# Patient Record
Sex: MALE | Race: WHITE | NOT HISPANIC OR LATINO
[De-identification: names, ages, dates, MRNs, and addresses within clinical notes are randomized per-mention and may not be internally consistent; named-entity substitution may affect disease eponyms.]

---

## 2018-02-08 ENCOUNTER — APPOINTMENT (OUTPATIENT)
Dept: ORTHOPEDIC SURGERY | Facility: CLINIC | Age: 68
End: 2018-02-08

## 2018-03-15 ENCOUNTER — APPOINTMENT (OUTPATIENT)
Dept: ORTHOPEDIC SURGERY | Facility: CLINIC | Age: 68
End: 2018-03-15
Payer: MEDICARE

## 2018-03-15 VITALS
HEIGHT: 75 IN | BODY MASS INDEX: 25.49 KG/M2 | WEIGHT: 205 LBS | SYSTOLIC BLOOD PRESSURE: 155 MMHG | HEART RATE: 62 BPM | DIASTOLIC BLOOD PRESSURE: 92 MMHG

## 2018-03-15 DIAGNOSIS — Z78.9 OTHER SPECIFIED HEALTH STATUS: ICD-10-CM

## 2018-03-15 DIAGNOSIS — Z86.39 PERSONAL HISTORY OF OTHER ENDOCRINE, NUTRITIONAL AND METABOLIC DISEASE: ICD-10-CM

## 2018-03-15 DIAGNOSIS — D44.9: ICD-10-CM

## 2018-03-15 DIAGNOSIS — Z85.07 PERSONAL HISTORY OF MALIGNANT NEOPLASM OF PANCREAS: ICD-10-CM

## 2018-03-15 DIAGNOSIS — M54.6 PAIN IN THORACIC SPINE: ICD-10-CM

## 2018-03-15 DIAGNOSIS — M47.817 SPONDYLOSIS W/OUT MYELOPATHY OR RADICULOPATHY, LUMBOSACRAL REGION: ICD-10-CM

## 2018-03-15 DIAGNOSIS — Z80.0 FAMILY HISTORY OF MALIGNANT NEOPLASM OF DIGESTIVE ORGANS: ICD-10-CM

## 2018-03-15 DIAGNOSIS — R07.81 PLEURODYNIA: ICD-10-CM

## 2018-03-15 PROCEDURE — 72070 X-RAY EXAM THORAC SPINE 2VWS: CPT

## 2018-03-15 PROCEDURE — 72100 X-RAY EXAM L-S SPINE 2/3 VWS: CPT

## 2018-03-15 PROCEDURE — 99204 OFFICE O/P NEW MOD 45 MIN: CPT

## 2018-07-28 PROBLEM — M47.817 LUMBOSACRAL SPONDYLOSIS: Status: ACTIVE | Noted: 2018-03-15

## 2019-05-07 ENCOUNTER — APPOINTMENT (OUTPATIENT)
Dept: COLORECTAL SURGERY | Facility: CLINIC | Age: 69
End: 2019-05-07
Payer: MEDICARE

## 2019-05-07 VITALS
BODY MASS INDEX: 26.11 KG/M2 | RESPIRATION RATE: 14 BRPM | DIASTOLIC BLOOD PRESSURE: 98 MMHG | OXYGEN SATURATION: 95 % | HEART RATE: 63 BPM | WEIGHT: 210 LBS | SYSTOLIC BLOOD PRESSURE: 142 MMHG | HEIGHT: 75 IN

## 2019-05-07 DIAGNOSIS — Z12.11 ENCOUNTER FOR SCREENING FOR MALIGNANT NEOPLASM OF COLON: ICD-10-CM

## 2019-05-07 DIAGNOSIS — Z87.19 PERSONAL HISTORY OF OTHER DISEASES OF THE DIGESTIVE SYSTEM: ICD-10-CM

## 2019-05-07 DIAGNOSIS — K60.3 ANAL FISTULA: ICD-10-CM

## 2019-05-07 DIAGNOSIS — Z83.71 FAMILY HISTORY OF COLONIC POLYPS: ICD-10-CM

## 2019-05-07 DIAGNOSIS — Z78.9 OTHER SPECIFIED HEALTH STATUS: ICD-10-CM

## 2019-05-07 DIAGNOSIS — Z86.79 PERSONAL HISTORY OF OTHER DISEASES OF THE CIRCULATORY SYSTEM: ICD-10-CM

## 2019-05-07 DIAGNOSIS — D3A.8 OTHER BENIGN NEUROENDOCRINE TUMORS: ICD-10-CM

## 2019-05-07 PROCEDURE — 99203 OFFICE O/P NEW LOW 30 MIN: CPT

## 2019-05-07 RX ORDER — UBIDECARENONE/VIT E ACET 100MG-5
CAPSULE ORAL
Refills: 0 | Status: DISCONTINUED | COMMUNITY
End: 2019-05-07

## 2019-05-07 NOTE — ASSESSMENT
[FreeTextEntry1] : Screening colonoscopy\par -Patient progressing well\par -MiraLax prep given\par -All questions answered\par -Patient scheduled for colonoscopy

## 2019-05-07 NOTE — PHYSICAL EXAM
[Normal Breath Sounds] : Normal breath sounds [Normal Heart Sounds] : normal heart sounds [Normal Rate and Rhythm] : normal rate and rhythm [Alert] : alert [Oriented to Person] : oriented to person [Oriented to Place] : oriented to place [Oriented to Time] : oriented to time [Calm] : calm [de-identified] : soft, NT/ND +BS [de-identified] : +ROM/PAULINA [de-identified] : well nourished male [de-identified] : NC/AT [de-identified] : intact

## 2019-05-07 NOTE — HISTORY OF PRESENT ILLNESS
[FreeTextEntry1] : 67yo male PMH neuroendocrine tumor of pancreas (underwent laparoscopy for removal) presents for office visit prior to colonoscopy.  Pt with +Fam Hx (Mother Dx age 55,  age 57, brother and sister history of colon polyps).  Pt most recent Colonoscopy  (Dr. Dennis).  Pt reports nl study.  Pt has a daily BM, denies constipation/straining, pain or BRBPR.  No aggravating factors.

## 2019-05-16 ENCOUNTER — APPOINTMENT (OUTPATIENT)
Dept: COLORECTAL SURGERY | Facility: CLINIC | Age: 69
End: 2019-05-16
Payer: MEDICARE

## 2019-05-16 PROCEDURE — 45378 DIAGNOSTIC COLONOSCOPY: CPT

## 2020-12-21 PROBLEM — Z12.11 ENCOUNTER FOR SCREENING COLONOSCOPY: Status: RESOLVED | Noted: 2019-05-07 | Resolved: 2020-12-21

## 2021-07-01 ENCOUNTER — INPATIENT (INPATIENT)
Facility: HOSPITAL | Age: 71
LOS: 0 days | Discharge: ANOTHER HOSPITAL | End: 2021-07-02
Attending: INTERNAL MEDICINE | Admitting: INTERNAL MEDICINE
Payer: MEDICARE

## 2021-07-01 ENCOUNTER — TRANSCRIPTION ENCOUNTER (OUTPATIENT)
Age: 71
End: 2021-07-01

## 2021-07-01 VITALS
TEMPERATURE: 98 F | HEART RATE: 60 BPM | OXYGEN SATURATION: 100 % | DIASTOLIC BLOOD PRESSURE: 101 MMHG | HEIGHT: 75 IN | RESPIRATION RATE: 18 BRPM | WEIGHT: 192.9 LBS | SYSTOLIC BLOOD PRESSURE: 148 MMHG

## 2021-07-01 DIAGNOSIS — R94.39 ABNORMAL RESULT OF OTHER CARDIOVASCULAR FUNCTION STUDY: ICD-10-CM

## 2021-07-01 DIAGNOSIS — Z98.890 OTHER SPECIFIED POSTPROCEDURAL STATES: Chronic | ICD-10-CM

## 2021-07-01 DIAGNOSIS — C7A.8 OTHER MALIGNANT NEUROENDOCRINE TUMORS: Chronic | ICD-10-CM

## 2021-07-01 LAB
A1C WITH ESTIMATED AVERAGE GLUCOSE RESULT: 5.2 % — SIGNIFICANT CHANGE UP (ref 4–5.6)
ANION GAP SERPL CALC-SCNC: 14 MMOL/L — SIGNIFICANT CHANGE UP (ref 7–14)
B PERT DNA SPEC QL NAA+PROBE: SIGNIFICANT CHANGE UP
BUN SERPL-MCNC: 17 MG/DL — SIGNIFICANT CHANGE UP (ref 7–23)
C PNEUM DNA SPEC QL NAA+PROBE: SIGNIFICANT CHANGE UP
CALCIUM SERPL-MCNC: 9.9 MG/DL — SIGNIFICANT CHANGE UP (ref 8.4–10.5)
CHLORIDE SERPL-SCNC: 104 MMOL/L — SIGNIFICANT CHANGE UP (ref 98–107)
CO2 SERPL-SCNC: 23 MMOL/L — SIGNIFICANT CHANGE UP (ref 22–31)
CREAT SERPL-MCNC: 1.26 MG/DL — SIGNIFICANT CHANGE UP (ref 0.5–1.3)
ESTIMATED AVERAGE GLUCOSE: 103 — SIGNIFICANT CHANGE UP
FLUAV SUBTYP SPEC NAA+PROBE: SIGNIFICANT CHANGE UP
FLUBV RNA SPEC QL NAA+PROBE: SIGNIFICANT CHANGE UP
GLUCOSE SERPL-MCNC: 102 MG/DL — HIGH (ref 70–99)
HADV DNA SPEC QL NAA+PROBE: SIGNIFICANT CHANGE UP
HCOV 229E RNA SPEC QL NAA+PROBE: SIGNIFICANT CHANGE UP
HCOV HKU1 RNA SPEC QL NAA+PROBE: SIGNIFICANT CHANGE UP
HCOV NL63 RNA SPEC QL NAA+PROBE: SIGNIFICANT CHANGE UP
HCOV OC43 RNA SPEC QL NAA+PROBE: SIGNIFICANT CHANGE UP
HCT VFR BLD CALC: 49 % — SIGNIFICANT CHANGE UP (ref 39–50)
HGB BLD-MCNC: 16.2 G/DL — SIGNIFICANT CHANGE UP (ref 13–17)
HMPV RNA SPEC QL NAA+PROBE: SIGNIFICANT CHANGE UP
HPIV1 RNA SPEC QL NAA+PROBE: SIGNIFICANT CHANGE UP
HPIV2 RNA SPEC QL NAA+PROBE: SIGNIFICANT CHANGE UP
HPIV3 RNA SPEC QL NAA+PROBE: SIGNIFICANT CHANGE UP
HPIV4 RNA SPEC QL NAA+PROBE: SIGNIFICANT CHANGE UP
MCHC RBC-ENTMCNC: 29.3 PG — SIGNIFICANT CHANGE UP (ref 27–34)
MCHC RBC-ENTMCNC: 33.1 GM/DL — SIGNIFICANT CHANGE UP (ref 32–36)
MCV RBC AUTO: 88.8 FL — SIGNIFICANT CHANGE UP (ref 80–100)
NRBC # BLD: 0 /100 WBCS — SIGNIFICANT CHANGE UP
NRBC # FLD: 0 K/UL — SIGNIFICANT CHANGE UP
PLATELET # BLD AUTO: 170 K/UL — SIGNIFICANT CHANGE UP (ref 150–400)
POTASSIUM SERPL-MCNC: 3.8 MMOL/L — SIGNIFICANT CHANGE UP (ref 3.5–5.3)
POTASSIUM SERPL-SCNC: 3.8 MMOL/L — SIGNIFICANT CHANGE UP (ref 3.5–5.3)
RAPID RVP RESULT: SIGNIFICANT CHANGE UP
RBC # BLD: 5.52 M/UL — SIGNIFICANT CHANGE UP (ref 4.2–5.8)
RBC # FLD: 12.6 % — SIGNIFICANT CHANGE UP (ref 10.3–14.5)
RSV RNA SPEC QL NAA+PROBE: SIGNIFICANT CHANGE UP
RV+EV RNA SPEC QL NAA+PROBE: SIGNIFICANT CHANGE UP
SARS-COV-2 RNA SPEC QL NAA+PROBE: SIGNIFICANT CHANGE UP
SODIUM SERPL-SCNC: 141 MMOL/L — SIGNIFICANT CHANGE UP (ref 135–145)
WBC # BLD: 6.49 K/UL — SIGNIFICANT CHANGE UP (ref 3.8–10.5)
WBC # FLD AUTO: 6.49 K/UL — SIGNIFICANT CHANGE UP (ref 3.8–10.5)

## 2021-07-01 PROCEDURE — 93010 ELECTROCARDIOGRAM REPORT: CPT

## 2021-07-01 PROCEDURE — 93454 CORONARY ARTERY ANGIO S&I: CPT | Mod: 26

## 2021-07-01 PROCEDURE — 99152 MOD SED SAME PHYS/QHP 5/>YRS: CPT

## 2021-07-01 RX ORDER — UBIDECARENONE 100 MG
0 CAPSULE ORAL
Qty: 0 | Refills: 0 | DISCHARGE

## 2021-07-01 RX ORDER — ATORVASTATIN CALCIUM 80 MG/1
1 TABLET, FILM COATED ORAL
Qty: 0 | Refills: 0 | DISCHARGE

## 2021-07-01 RX ORDER — ATORVASTATIN CALCIUM 80 MG/1
40 TABLET, FILM COATED ORAL AT BEDTIME
Refills: 0 | Status: DISCONTINUED | OUTPATIENT
Start: 2021-07-01 | End: 2021-07-02

## 2021-07-01 RX ORDER — ASPIRIN/CALCIUM CARB/MAGNESIUM 324 MG
1 TABLET ORAL
Qty: 0 | Refills: 0 | DISCHARGE
Start: 2021-07-01

## 2021-07-01 RX ORDER — OMEGA-3 ACID ETHYL ESTERS 1 G
0 CAPSULE ORAL
Qty: 0 | Refills: 0 | DISCHARGE

## 2021-07-01 RX ORDER — ACETAMINOPHEN 500 MG
650 TABLET ORAL ONCE
Refills: 0 | Status: COMPLETED | OUTPATIENT
Start: 2021-07-01 | End: 2021-07-01

## 2021-07-01 RX ORDER — ATORVASTATIN CALCIUM 80 MG/1
1 TABLET, FILM COATED ORAL
Qty: 0 | Refills: 0 | DISCHARGE
Start: 2021-07-01

## 2021-07-01 RX ORDER — LOSARTAN POTASSIUM 100 MG/1
50 TABLET, FILM COATED ORAL DAILY
Refills: 0 | Status: DISCONTINUED | OUTPATIENT
Start: 2021-07-01 | End: 2021-07-02

## 2021-07-01 RX ORDER — SODIUM CHLORIDE 9 MG/ML
3 INJECTION INTRAMUSCULAR; INTRAVENOUS; SUBCUTANEOUS EVERY 8 HOURS
Refills: 0 | Status: DISCONTINUED | OUTPATIENT
Start: 2021-07-01 | End: 2021-07-02

## 2021-07-01 RX ORDER — LOSARTAN POTASSIUM 100 MG/1
1 TABLET, FILM COATED ORAL
Qty: 0 | Refills: 0 | DISCHARGE
Start: 2021-07-01

## 2021-07-01 RX ORDER — ASPIRIN/CALCIUM CARB/MAGNESIUM 324 MG
81 TABLET ORAL DAILY
Refills: 0 | Status: DISCONTINUED | OUTPATIENT
Start: 2021-07-01 | End: 2021-07-02

## 2021-07-01 RX ORDER — ERGOCALCIFEROL 1.25 MG/1
0 CAPSULE ORAL
Qty: 0 | Refills: 0 | DISCHARGE

## 2021-07-01 RX ORDER — LOSARTAN POTASSIUM 100 MG/1
1 TABLET, FILM COATED ORAL
Qty: 0 | Refills: 0 | DISCHARGE

## 2021-07-01 RX ORDER — SODIUM CHLORIDE 9 MG/ML
3 INJECTION INTRAMUSCULAR; INTRAVENOUS; SUBCUTANEOUS
Qty: 0 | Refills: 0 | DISCHARGE
Start: 2021-07-01

## 2021-07-01 RX ORDER — LANOLIN ALCOHOL/MO/W.PET/CERES
9 CREAM (GRAM) TOPICAL ONCE
Refills: 0 | Status: COMPLETED | OUTPATIENT
Start: 2021-07-01 | End: 2021-07-01

## 2021-07-01 RX ADMIN — SODIUM CHLORIDE 3 MILLILITER(S): 9 INJECTION INTRAMUSCULAR; INTRAVENOUS; SUBCUTANEOUS at 22:57

## 2021-07-01 RX ADMIN — Medication 9 MILLIGRAM(S): at 23:26

## 2021-07-01 RX ADMIN — LOSARTAN POTASSIUM 50 MILLIGRAM(S): 100 TABLET, FILM COATED ORAL at 22:40

## 2021-07-01 RX ADMIN — Medication 650 MILLIGRAM(S): at 21:29

## 2021-07-01 RX ADMIN — Medication 650 MILLIGRAM(S): at 20:29

## 2021-07-01 RX ADMIN — ATORVASTATIN CALCIUM 40 MILLIGRAM(S): 80 TABLET, FILM COATED ORAL at 22:40

## 2021-07-01 RX ADMIN — Medication 81 MILLIGRAM(S): at 22:40

## 2021-07-01 NOTE — DISCHARGE NOTE PROVIDER - HOSPITAL COURSE
70 y.o. male presents today for elective cardiac catheterization. The patient c/o midsternal chest pressure, started 6 weeks ago, aggravate while playing tennis or jogging, last 1/2 hr, sometimes radiates to L shoulder and L axiliary area,  resolve on its own or with rest. The patient denies SOB, palpitations, dizziness, presyncope, syncope,  headache, visual disturbances, CVA, PE, DVT, JENA, abdominal pain, N/V/D/C, hematochezia, melena, dysuria, hematuria, fever, chills. The patient was evaluated by his cardiologist, was found to have abnormal stress test. Due to patient's symptoms and abnormal non invasive tests, the patient was recommended to have cardiac cath.   On 7/1 underwent elective cardiac cath with the following results - LM 20%, oLAD 90%, mLAD 90%, pLCx 70%=> needs CABG. Dr Bush discussed with patient, patient agreed to be transferred to [facility] for further CT surgery evaluation, and  accepting physician CT surgeon Dr Westfall .  Patient medically stable. Afebrile, VSS. Patient seen and evaluated. Consent for transfer signed, and in the chart .All questions and concerns addressed and answered . Patient understands and agrees with plan of care.

## 2021-07-01 NOTE — H&P CARDIOLOGY - HISTORY OF PRESENT ILLNESS
70 y.o. male presents today for elective cardiac catheterization. The patient c/o midsternal chest pressure, started 6 weeks ago, aggravate while playing tennis or jogging, last 1/2 hr, sometimes radiates to L shoulder and L axiliary area,  resolve on its own or with rest. The patient denies SOB, palpitations, dizziness, presyncope, syncope,  headache, visual disturbances, CVA, PE, DVT, JENA, abdominal pain, N/V/D/C, hematochezia, melena, dysuria, hematuria, fever, chills. The patient was evaluated by his cardiologist, was found to have abnormal stress test. Due to patient's symptoms and abnormal non invasive tests, the patient was recommended to have cardiac cath. The patient denies any complaints at present.     Stress Echo 6/28/21 - 64%. Lateral wall is moderately hypokinetic, stress ECG is positive for ischemia.

## 2021-07-01 NOTE — DISCHARGE NOTE PROVIDER - NSDCMRMEDTOKEN_GEN_ALL_CORE_FT
aspirin 81 mg oral delayed release tablet: 1 tab(s) orally once a day  atorvastatin 40 mg oral tablet: 1 tab(s) orally once a day (at bedtime)  losartan 50 mg oral tablet: 1 tab(s) orally once a day  sodium chloride 0.9% injectable solution: 3 milliliter(s) injectable every 8 hours

## 2021-07-01 NOTE — DISCHARGE NOTE PROVIDER - NSDCCPCAREPLAN_GEN_ALL_CORE_FT
PRINCIPAL DISCHARGE DIAGNOSIS  Diagnosis: CAD, multiple vessel  Assessment and Plan of Treatment: You underwent cardiac cath and due to the location of your blockages you will be transferred over to [facility] for Cardiothoracic evaluation with Dr Westfall for possible Bypass surgery.      SECONDARY DISCHARGE DIAGNOSES  Diagnosis: HTN (hypertension)  Assessment and Plan of Treatment: Continue blood pressure medication regimen as directed. Monitor for any visual changes, headaches or dizziness.  Monitor blood pressure regularly.  Follow up with your PCP for further management for high blood pressure.

## 2021-07-01 NOTE — DISCHARGE NOTE PROVIDER - NSDCQMERRANDS_GEN_ALL_CORE
Yes I, Mukesh Matias DO,  performed the initial face to face bedside interview with this patient regarding history of present illness, review of symptoms and relevant past medical, social and family history.  I completed an independent physical examination.  I was the initial provider who evaluated this patient. I have signed out the follow up of any pending tests (i.e. labs, radiological studies) to the ACP.  I have communicated the patient’s plan of care and disposition with the ACP.

## 2021-07-01 NOTE — H&P CARDIOLOGY - PMH
HTN (hypertension), benign    Hypercholesterolemia    Neuroendocrine carcinoma of pancreas  treated surgically 12 years ago

## 2021-07-01 NOTE — H&P CARDIOLOGY - REVIEW OF SYSTEMS
The patient denies SOB, palpitations, dizziness, presyncope, syncope,  headache, visual disturbances, CVA, PE, DVT, JENA, abdominal pain, N/V/D/C, hematochezia, melena, dysuria, hematuria, fever, chills.

## 2021-07-01 NOTE — H&P CARDIOLOGY - FAMILY HISTORY
Father  Still living? No  Family history of heart attack, Age at diagnosis: Age Unknown     Mother  Still living? No  Family history of colon cancer in mother, Age at diagnosis: Age Unknown

## 2021-07-02 ENCOUNTER — INPATIENT (INPATIENT)
Facility: HOSPITAL | Age: 71
LOS: 4 days | Discharge: HOME HEALTH | DRG: 235 | End: 2021-07-07
Attending: THORACIC SURGERY (CARDIOTHORACIC VASCULAR SURGERY) | Admitting: THORACIC SURGERY (CARDIOTHORACIC VASCULAR SURGERY)
Payer: MEDICARE

## 2021-07-02 ENCOUNTER — TRANSCRIPTION ENCOUNTER (OUTPATIENT)
Age: 71
End: 2021-07-02

## 2021-07-02 VITALS
TEMPERATURE: 98 F | HEIGHT: 75 IN | HEART RATE: 66 BPM | RESPIRATION RATE: 18 BRPM | DIASTOLIC BLOOD PRESSURE: 92 MMHG | OXYGEN SATURATION: 95 % | WEIGHT: 192.68 LBS | SYSTOLIC BLOOD PRESSURE: 138 MMHG

## 2021-07-02 VITALS
SYSTOLIC BLOOD PRESSURE: 123 MMHG | HEART RATE: 66 BPM | TEMPERATURE: 98 F | RESPIRATION RATE: 17 BRPM | DIASTOLIC BLOOD PRESSURE: 83 MMHG | OXYGEN SATURATION: 98 %

## 2021-07-02 DIAGNOSIS — C7A.8 OTHER MALIGNANT NEUROENDOCRINE TUMORS: Chronic | ICD-10-CM

## 2021-07-02 DIAGNOSIS — E78.00 PURE HYPERCHOLESTEROLEMIA, UNSPECIFIED: ICD-10-CM

## 2021-07-02 DIAGNOSIS — I25.10 ATHEROSCLEROTIC HEART DISEASE OF NATIVE CORONARY ARTERY WITHOUT ANGINA PECTORIS: ICD-10-CM

## 2021-07-02 DIAGNOSIS — I10 ESSENTIAL (PRIMARY) HYPERTENSION: ICD-10-CM

## 2021-07-02 DIAGNOSIS — Z98.890 OTHER SPECIFIED POSTPROCEDURAL STATES: Chronic | ICD-10-CM

## 2021-07-02 LAB
ANION GAP SERPL CALC-SCNC: 16 MMOL/L — HIGH (ref 7–14)
BUN SERPL-MCNC: 18 MG/DL — SIGNIFICANT CHANGE UP (ref 7–23)
CALCIUM SERPL-MCNC: 9.6 MG/DL — SIGNIFICANT CHANGE UP (ref 8.4–10.5)
CHLORIDE SERPL-SCNC: 105 MMOL/L — SIGNIFICANT CHANGE UP (ref 98–107)
CO2 SERPL-SCNC: 17 MMOL/L — LOW (ref 22–31)
COVID-19 SPIKE DOMAIN AB INTERP: POSITIVE
COVID-19 SPIKE DOMAIN ANTIBODY RESULT: >250 U/ML — HIGH
CREAT SERPL-MCNC: 1.1 MG/DL — SIGNIFICANT CHANGE UP (ref 0.5–1.3)
GLUCOSE SERPL-MCNC: 66 MG/DL — LOW (ref 70–99)
HCT VFR BLD CALC: 48.4 % — SIGNIFICANT CHANGE UP (ref 39–50)
HGB BLD-MCNC: 15.7 G/DL — SIGNIFICANT CHANGE UP (ref 13–17)
MAGNESIUM SERPL-MCNC: 2.2 MG/DL — SIGNIFICANT CHANGE UP (ref 1.6–2.6)
MCHC RBC-ENTMCNC: 29.5 PG — SIGNIFICANT CHANGE UP (ref 27–34)
MCHC RBC-ENTMCNC: 32.4 GM/DL — SIGNIFICANT CHANGE UP (ref 32–36)
MCV RBC AUTO: 90.8 FL — SIGNIFICANT CHANGE UP (ref 80–100)
NRBC # BLD: 0 /100 WBCS — SIGNIFICANT CHANGE UP
NRBC # FLD: 0 K/UL — SIGNIFICANT CHANGE UP
PHOSPHATE SERPL-MCNC: 3.2 MG/DL — SIGNIFICANT CHANGE UP (ref 2.5–4.5)
PLATELET # BLD AUTO: 145 K/UL — LOW (ref 150–400)
POTASSIUM SERPL-MCNC: 4.1 MMOL/L — SIGNIFICANT CHANGE UP (ref 3.5–5.3)
POTASSIUM SERPL-SCNC: 4.1 MMOL/L — SIGNIFICANT CHANGE UP (ref 3.5–5.3)
RBC # BLD: 5.33 M/UL — SIGNIFICANT CHANGE UP (ref 4.2–5.8)
RBC # FLD: 12.9 % — SIGNIFICANT CHANGE UP (ref 10.3–14.5)
SARS-COV-2 IGG+IGM SERPL QL IA: >250 U/ML — HIGH
SARS-COV-2 IGG+IGM SERPL QL IA: POSITIVE
SODIUM SERPL-SCNC: 138 MMOL/L — SIGNIFICANT CHANGE UP (ref 135–145)
WBC # BLD: 6.14 K/UL — SIGNIFICANT CHANGE UP (ref 3.8–10.5)
WBC # FLD AUTO: 6.14 K/UL — SIGNIFICANT CHANGE UP (ref 3.8–10.5)

## 2021-07-02 PROCEDURE — 99223 1ST HOSP IP/OBS HIGH 75: CPT

## 2021-07-02 PROCEDURE — 71045 X-RAY EXAM CHEST 1 VIEW: CPT | Mod: 26

## 2021-07-02 RX ORDER — PANTOPRAZOLE SODIUM 20 MG/1
40 TABLET, DELAYED RELEASE ORAL
Refills: 0 | Status: DISCONTINUED | OUTPATIENT
Start: 2021-07-02 | End: 2021-07-04

## 2021-07-02 RX ORDER — SODIUM CHLORIDE 9 MG/ML
3 INJECTION INTRAMUSCULAR; INTRAVENOUS; SUBCUTANEOUS EVERY 8 HOURS
Refills: 0 | Status: DISCONTINUED | OUTPATIENT
Start: 2021-07-02 | End: 2021-07-04

## 2021-07-02 RX ORDER — ENOXAPARIN SODIUM 100 MG/ML
40 INJECTION SUBCUTANEOUS ONCE
Refills: 0 | Status: COMPLETED | OUTPATIENT
Start: 2021-07-03 | End: 2021-07-03

## 2021-07-02 RX ORDER — ENOXAPARIN SODIUM 100 MG/ML
30 INJECTION SUBCUTANEOUS DAILY
Refills: 0 | Status: DISCONTINUED | OUTPATIENT
Start: 2021-07-02 | End: 2021-07-02

## 2021-07-02 RX ORDER — ATORVASTATIN CALCIUM 80 MG/1
40 TABLET, FILM COATED ORAL AT BEDTIME
Refills: 0 | Status: DISCONTINUED | OUTPATIENT
Start: 2021-07-02 | End: 2021-07-04

## 2021-07-02 RX ORDER — TAMSULOSIN HYDROCHLORIDE 0.4 MG/1
0.4 CAPSULE ORAL AT BEDTIME
Refills: 0 | Status: DISCONTINUED | OUTPATIENT
Start: 2021-07-02 | End: 2021-07-04

## 2021-07-02 RX ORDER — METOPROLOL TARTRATE 50 MG
12.5 TABLET ORAL
Refills: 0 | Status: DISCONTINUED | OUTPATIENT
Start: 2021-07-02 | End: 2021-07-04

## 2021-07-02 RX ORDER — ASPIRIN/CALCIUM CARB/MAGNESIUM 324 MG
81 TABLET ORAL DAILY
Refills: 0 | Status: DISCONTINUED | OUTPATIENT
Start: 2021-07-03 | End: 2021-07-04

## 2021-07-02 RX ORDER — ALPRAZOLAM 0.25 MG
0.25 TABLET ORAL ONCE
Refills: 0 | Status: DISCONTINUED | OUTPATIENT
Start: 2021-07-02 | End: 2021-07-02

## 2021-07-02 RX ADMIN — Medication 81 MILLIGRAM(S): at 12:08

## 2021-07-02 RX ADMIN — SODIUM CHLORIDE 3 MILLILITER(S): 9 INJECTION INTRAMUSCULAR; INTRAVENOUS; SUBCUTANEOUS at 06:28

## 2021-07-02 RX ADMIN — Medication 0.25 MILLIGRAM(S): at 18:55

## 2021-07-02 RX ADMIN — SODIUM CHLORIDE 3 MILLILITER(S): 9 INJECTION INTRAMUSCULAR; INTRAVENOUS; SUBCUTANEOUS at 12:03

## 2021-07-02 RX ADMIN — ATORVASTATIN CALCIUM 40 MILLIGRAM(S): 80 TABLET, FILM COATED ORAL at 22:49

## 2021-07-02 RX ADMIN — Medication 0.5 MILLIGRAM(S): at 10:08

## 2021-07-02 NOTE — H&P ADULT - NSHPREVIEWOFSYSTEMS_GEN_ALL_CORE
REVIEW OF SYSTEMS:  CONSTITUTIONAL: Denies fever, weight loss, or fatigue  EYES: Denies eye pain, visual disturbances, or discharge  RESPIRATORY: Denies SOB, cough, wheezing, chills or hemoptysis  CARDIOVASCULAR: chest pressure x6 weeks on exertion  GASTROINTESTINAL: Denies abdominal pain, nausea, vomiting, hematemesis, diarrhea, melena  GENITOURINARY: Denies dysuria, frequency, hematuria, or incontinence  NEUROLOGICAL: Denies headaches, memory loss, loss of strength, numbness, or tremors  SKIN: Denies itching, burning, rashes, or lesions   ENDOCRINE: Denies heat or cold intolerance, hair loss  MUSCULOSKELETAL: Denies joint pain or swelling, muscle, back, or extremity pain  PSYCHIATRIC: Denies depression, anxiety, mood swings, or difficulty sleeping  HEME/LYMPH: Denies easy bruising, denies bleeding gums and mucous membranes

## 2021-07-02 NOTE — H&P ADULT - ASSESSMENT
70 y.o. male pmHX HTN, HLD, remote hx laparoscopic removal of neuroendocrine pancreatic tumor 14 years ago with 6 weeks of exertional chest pain and positive stress echo s/p [facility] 7/1 showing ostial LAD 80%, mid LAD 80%, pLCX 75% and recommend surgical evaluation, stress Echo 6/28/21 - 64%. Lateral wall is moderately hypokinetic, stress ECG is positive for ischemia. Transferred to [facility] for CABG evaluation.

## 2021-07-02 NOTE — H&P ADULT - NSHPSOCIALHISTORY_GEN_ALL_CORE
social drinker  no recent tobacco use  remote tobacco use 3 years in his 20's     father CAD, s/p CABG

## 2021-07-02 NOTE — DISCHARGE NOTE NURSING/CASE MANAGEMENT/SOCIAL WORK - NSDCPEFALRISK_GEN_ALL_CORE
Patient information on fall and injury prevention
Consult received and chart reviewed. The patient was seen at bedside this afternoon for an initial assessment of swallow function, at which time he was alert and cooperative. Patient denying any speech/language/swallowing difficulties at this time. The patient appropriately engaged in conversational discourse with this clinician without any evidence of dysarthria, apraxia, or expressive/receptive language difficulties.    Per charting, the patient is a "51 yo M with hx CVA in past, is on Eliquis (unclear what time last took), also h/o AF, HTN, hyperthyroid, DM, BPH - came in c/o sudden onset R sided facial droop and weakness to R side of body. Pt with hx same symptoms in August - cta / MRI were negative at that time. Pt now has recurrent symptoms -- onset ~ 30 min PTA. No cp/sob/palp. Pt states feels overall weak. No fall/ recent trauma. No aggravating/alleviating factors. No other injuries or other complaints." Recent CXR revealed, "Borderline cardiomegaly. No sign of lobar consolidation vascular congestion or effusion. Hilar regions, mediastinal contours intact. No acute osseous abnormalities." Discussed results and recommendations from this evaluation with the patient, RN, and call out to MD.

## 2021-07-02 NOTE — CONSULT NOTE ADULT - ASSESSMENT
70 y.o. male with 6 weeks of exertional chest pain and positive stress echo s/p [facility] 7/1 showing ostial LAD 80%, mid LAD 80%, pLCX 75% and recommend surgical evaluation  -RRA site is c/d/i, no hematoma, no tenderness, +2 radial pulse and brisk cap refill  -continue ASA 81 and high intensity lipitor.  pending CT surgical evaluation for revascularization

## 2021-07-02 NOTE — CONSULT NOTE ADULT - SUBJECTIVE AND OBJECTIVE BOX
Patient seen and evaluated at bedside    Chief Complaint:    HPI:  70 y.o. male presents today for elective cardiac catheterization. The patient c/o midsternal chest pressure, started 6 weeks ago, aggravate while playing tennis or jogging, last 1/2 hr, sometimes radiates to L shoulder and L axiliary area,  resolve on its own or with rest. The patient denies SOB, palpitations, dizziness, presyncope, syncope,  headache, visual disturbances, CVA, PE, DVT, JENA, abdominal pain, N/V/D/C, hematochezia, melena, dysuria, hematuria, fever, chills. The patient was evaluated by his cardiologist, was found to have abnormal stress test. Due to patient's symptoms and abnormal non invasive tests, the patient was recommended to have cardiac cath. The patient denies any complaints at present.     Stress Echo 6/28/21 - 64%. Lateral wall is moderately hypokinetic, stress ECG is positive for ischemia.   Underwent LHC on 7/1 which showed ostial LAD 80%, mid LAD 80%, pLCX 75% and recommend surgical evaluation    This AM, he denies any complaints- no chest pain, SOB, no RRA site pain/swelling/numbness/tenderness.    LHC    VENTRICLES: No left ventriculogram was performed.  CORONARY VESSELS: The coronary circulation is right dominant.  LM:   --  LM: Angiography showed minor luminal irregularities with no flow  limiting lesions.  LAD:   --  Ostial LAD: There was a discrete 80 % stenosis. There was JOHN  grade 3 flow through the vessel (brisk flow).  --  Mid LAD: There was a tubular 80 % stenosis in the proximal third of the  vessel segment, just after S1. There was JOHN grade 3 flow through the  vessel (brisk flow).  CX:   --  Ostial circumflex: There was a discrete 30 % stenosis.  --  Proximal circumflex: There was a discrete 75 % stenosis. There was JOHN  grade 3 flow through the vessel (brisk flow).  RCA:   --  RCA: Angiography showed minor luminal irregularities with no  flow limiting lesions.  --  Mid RCA: The vessel was mildly ectatic.  --  Distal RCA: There was a tubular 20 % stenosis.  COMPLICATIONS: There were no complications.  DIAGNOSTIC RECOMMENDATIONS: Consultation will be obtained for coronary  artery bypass grafting.      PMHx:   HTN (hypertension), benign    Hypercholesterolemia    Neuroendocrine carcinoma of pancreas        PSHx:   Neuroendocrine carcinoma of pancreas    History of hemorrhoidectomy        Allergies:  No Known Allergies      Home Meds:    Current Medications:   aspirin enteric coated 81 milliGRAM(s) Oral daily  atorvastatin 40 milliGRAM(s) Oral at bedtime  losartan 50 milliGRAM(s) Oral daily  sodium chloride 0.9% lock flush 3 milliLiter(s) IV Push every 8 hours      FAMILY HISTORY:  Family history of heart attack (Father)    Family history of colon cancer in mother (Mother)        Physical Exam:  T(F): 98.4 (07-02), Max: 98.7 (07-01)  HR: 60 (07-02) (60 - 64)  BP: 159/92 (07-02) (148/101 - 159/92)  RR: 18 (07-02)  SpO2: 99% (07-02)  GENERAL: No acute distress, well-developed  HEAD:  Atraumatic, Normocephalic  ENT: EOMI, PERRLA, conjunctiva and sclera clear, Neck supple, No JVD, moist mucosa  CHEST/LUNG: Clear to auscultation bilaterally; No wheeze, equal breath sounds bilaterally   BACK: No spinal tenderness  HEART: Regular rate and rhythm; No murmurs, rubs, or gallops  ABDOMEN: Soft, Nontender, Nondistended; Bowel sounds present  EXTREMITIES:  RRA c/d/i, no tenderness/hematoma/ swelling. +2 radial pulse and brisk cap refill    CXR: Personally reviewed    Labs: Personally reviewed                        16.2   6.49  )-----------( 170      ( 01 Jul 2021 14:05 )             49.0     07-01    141  |  104  |  17  ----------------------------<  102<H>  3.8   |  23  |  1.26    Ca    9.9      01 Jul 2021 14:05

## 2021-07-02 NOTE — H&P ADULT - PROBLEM SELECTOR PLAN 1
preop cardiac surgery evaluation in progress  Asa81, statin, start beta-blockade therapy  CXR, UA, MRSA,   TTE

## 2021-07-02 NOTE — H&P ADULT - NSHPPHYSICALEXAM_GEN_ALL_CORE
General: Well nourished, well developed, NAD.    Psychiatric: Normal mood, normal affect observed                              Neuro: Normal exam oriented to person/place & time with no focal motor or sensory  deficits.                    Chest: Normal lung exam with good air movement absence of crackles, wheezes, rales, or rhonchi                                                                      CV:  Auscultation: RRR, normal S1S2, no Murmurs   Carotids: No Bruits,  Abdominal Aorta: normal                                                                         GI: Normal exam of abdomen with no noted masses or tenderness. +[id number]                                                                                            Extremities: Normal, no evidence of cyanosis or deformity. No edema   Lower Extremity Pulses: B/L + 2  SKIN : Normal exam to inspection & palpation. Intact, no lesions.

## 2021-07-02 NOTE — H&P ADULT - HISTORY OF PRESENT ILLNESS
70 y.o. male pmHX HTN, HLD, remote hx laparoscopic removal of neuroendocrine pancreatic tumor 14 years ago with 6 weeks of exertional chest pain and positive stress echo s/p [facility] 7/1 showing ostial LAD 80%, mid LAD 80%, pLCX 75% and recommend surgical evaluation, stress Echo 6/28/21 - 64%. Lateral wall is moderately hypokinetic, stress ECG is positive for ischemia. Transferred to [facility] for CABG evaluation.

## 2021-07-02 NOTE — DISCHARGE NOTE NURSING/CASE MANAGEMENT/SOCIAL WORK - PATIENT PORTAL LINK FT
You can access the FollowMyHealth Patient Portal offered by [facility] by registering at the following website: [email or web address]. By joining [facility]’s FollowMyHealth portal, you will also be able to view your health information using other applications (apps) compatible with our system.

## 2021-07-02 NOTE — CHART NOTE - NSCHARTNOTEFT_GEN_A_CORE
Discussed with Cardiology Fellow Dr. Sheldon, patient likely to be transferred today to [facility] for CABG eval, currently there are no plans for surgery today. Okay to feed patient.
The patient presents today for cardiac cath, showed mLM 20%, oLAD 90%, mLAD 90%, pLCx 70%=> needs CABG surgery.  As per Dr. Bush, no need to start Heparin drip or b-blockers.  The patient will be transferred to [facility] or tomorrow for CABG surgery, Dr. Westfall.  Start ASA 81 mg daily.
Patient is s/p cardiac cath. Patient reports having some right wrist and arm soreness, otherwise denies any chest pain, palpitations, left arm pain, jaw pain, shortness of breath, numbness/tingling to right arm/hand or any other c/o's or new concerns. Right radial cath site soft, without swelling, without ecchymosis, or e/o hematoma, Dressing is clean/intact/dry. 2+ radial pulse and brisk rap refill.  Will continue to monitor closely.

## 2021-07-03 LAB
A1C WITH ESTIMATED AVERAGE GLUCOSE RESULT: 5.3 % — SIGNIFICANT CHANGE UP (ref 4–5.6)
ALBUMIN SERPL ELPH-MCNC: 4.1 G/DL — SIGNIFICANT CHANGE UP (ref 3.3–5)
ALP SERPL-CCNC: 76 U/L — SIGNIFICANT CHANGE UP (ref 40–120)
ALT FLD-CCNC: 16 U/L — SIGNIFICANT CHANGE UP (ref 10–45)
ANION GAP SERPL CALC-SCNC: 14 MMOL/L — SIGNIFICANT CHANGE UP (ref 5–17)
APPEARANCE UR: CLEAR — SIGNIFICANT CHANGE UP
APTT BLD: 31.2 SEC — SIGNIFICANT CHANGE UP (ref 27.5–35.5)
AST SERPL-CCNC: 18 U/L — SIGNIFICANT CHANGE UP (ref 10–40)
BILIRUB SERPL-MCNC: 0.9 MG/DL — SIGNIFICANT CHANGE UP (ref 0.2–1.2)
BILIRUB UR-MCNC: NEGATIVE — SIGNIFICANT CHANGE UP
BLD GP AB SCN SERPL QL: NEGATIVE — SIGNIFICANT CHANGE UP
BLD GP AB SCN SERPL QL: NEGATIVE — SIGNIFICANT CHANGE UP
BUN SERPL-MCNC: 16 MG/DL — SIGNIFICANT CHANGE UP (ref 7–23)
CALCIUM SERPL-MCNC: 9.7 MG/DL — SIGNIFICANT CHANGE UP (ref 8.4–10.5)
CHLORIDE SERPL-SCNC: 108 MMOL/L — SIGNIFICANT CHANGE UP (ref 96–108)
CO2 SERPL-SCNC: 18 MMOL/L — LOW (ref 22–31)
COLOR SPEC: YELLOW — SIGNIFICANT CHANGE UP
CREAT SERPL-MCNC: 1.02 MG/DL — SIGNIFICANT CHANGE UP (ref 0.5–1.3)
DIFF PNL FLD: NEGATIVE — SIGNIFICANT CHANGE UP
ESTIMATED AVERAGE GLUCOSE: 105 MG/DL — SIGNIFICANT CHANGE UP (ref 68–114)
FIBRINOGEN PPP-MCNC: 425 MG/DL — SIGNIFICANT CHANGE UP (ref 290–520)
GLUCOSE SERPL-MCNC: 91 MG/DL — SIGNIFICANT CHANGE UP (ref 70–99)
GLUCOSE UR QL: NEGATIVE — SIGNIFICANT CHANGE UP
HCT VFR BLD CALC: 46.4 % — SIGNIFICANT CHANGE UP (ref 39–50)
HCV AB S/CO SERPL IA: 0.2 S/CO — SIGNIFICANT CHANGE UP (ref 0–0.99)
HCV AB SERPL-IMP: SIGNIFICANT CHANGE UP
HGB BLD-MCNC: 15.2 G/DL — SIGNIFICANT CHANGE UP (ref 13–17)
INR BLD: 1.04 RATIO — SIGNIFICANT CHANGE UP (ref 0.88–1.16)
KETONES UR-MCNC: NEGATIVE — SIGNIFICANT CHANGE UP
LEUKOCYTE ESTERASE UR-ACNC: NEGATIVE — SIGNIFICANT CHANGE UP
MCHC RBC-ENTMCNC: 29.6 PG — SIGNIFICANT CHANGE UP (ref 27–34)
MCHC RBC-ENTMCNC: 32.8 GM/DL — SIGNIFICANT CHANGE UP (ref 32–36)
MCV RBC AUTO: 90.4 FL — SIGNIFICANT CHANGE UP (ref 80–100)
MRSA PCR RESULT.: SIGNIFICANT CHANGE UP
NITRITE UR-MCNC: NEGATIVE — SIGNIFICANT CHANGE UP
NRBC # BLD: 0 /100 WBCS — SIGNIFICANT CHANGE UP (ref 0–0)
NT-PROBNP SERPL-SCNC: 57 PG/ML — SIGNIFICANT CHANGE UP (ref 0–300)
PA ADP PRP-ACNC: 158 PRU — LOW (ref 194–417)
PH UR: 6 — SIGNIFICANT CHANGE UP (ref 5–8)
PLATELET # BLD AUTO: 154 K/UL — SIGNIFICANT CHANGE UP (ref 150–400)
POTASSIUM SERPL-MCNC: 3.8 MMOL/L — SIGNIFICANT CHANGE UP (ref 3.5–5.3)
POTASSIUM SERPL-SCNC: 3.8 MMOL/L — SIGNIFICANT CHANGE UP (ref 3.5–5.3)
PREALB SERPL-MCNC: 24 MG/DL — SIGNIFICANT CHANGE UP (ref 20–40)
PROT SERPL-MCNC: 6.7 G/DL — SIGNIFICANT CHANGE UP (ref 6–8.3)
PROT UR-MCNC: SIGNIFICANT CHANGE UP
PROTHROM AB SERPL-ACNC: 12.4 SEC — SIGNIFICANT CHANGE UP (ref 10.6–13.6)
RBC # BLD: 5.13 M/UL — SIGNIFICANT CHANGE UP (ref 4.2–5.8)
RBC # FLD: 12.7 % — SIGNIFICANT CHANGE UP (ref 10.3–14.5)
RH IG SCN BLD-IMP: NEGATIVE — SIGNIFICANT CHANGE UP
RH IG SCN BLD-IMP: NEGATIVE — SIGNIFICANT CHANGE UP
S AUREUS DNA NOSE QL NAA+PROBE: SIGNIFICANT CHANGE UP
SARS-COV-2 RNA SPEC QL NAA+PROBE: SIGNIFICANT CHANGE UP
SODIUM SERPL-SCNC: 140 MMOL/L — SIGNIFICANT CHANGE UP (ref 135–145)
SP GR SPEC: 1.02 — SIGNIFICANT CHANGE UP (ref 1.01–1.02)
T4 FREE SERPL-MCNC: 1.6 NG/DL — SIGNIFICANT CHANGE UP (ref 0.9–1.8)
TSH SERPL-MCNC: 1.14 UIU/ML — SIGNIFICANT CHANGE UP (ref 0.27–4.2)
UROBILINOGEN FLD QL: NEGATIVE — SIGNIFICANT CHANGE UP
WBC # BLD: 5.57 K/UL — SIGNIFICANT CHANGE UP (ref 3.8–10.5)
WBC # FLD AUTO: 5.57 K/UL — SIGNIFICANT CHANGE UP (ref 3.8–10.5)

## 2021-07-03 PROCEDURE — 93306 TTE W/DOPPLER COMPLETE: CPT | Mod: 26

## 2021-07-03 PROCEDURE — 99232 SBSQ HOSP IP/OBS MODERATE 35: CPT

## 2021-07-03 PROCEDURE — 93010 ELECTROCARDIOGRAM REPORT: CPT

## 2021-07-03 RX ORDER — ALPRAZOLAM 0.25 MG
0.25 TABLET ORAL EVERY 8 HOURS
Refills: 0 | Status: DISCONTINUED | OUTPATIENT
Start: 2021-07-03 | End: 2021-07-04

## 2021-07-03 RX ORDER — CEFUROXIME AXETIL 250 MG
1500 TABLET ORAL ONCE
Refills: 0 | Status: DISCONTINUED | OUTPATIENT
Start: 2021-07-04 | End: 2021-07-04

## 2021-07-03 RX ORDER — CHLORHEXIDINE GLUCONATE 213 G/1000ML
1 SOLUTION TOPICAL DAILY
Refills: 0 | Status: DISCONTINUED | OUTPATIENT
Start: 2021-07-03 | End: 2021-07-04

## 2021-07-03 RX ADMIN — Medication 12.5 MILLIGRAM(S): at 05:44

## 2021-07-03 RX ADMIN — PANTOPRAZOLE SODIUM 40 MILLIGRAM(S): 20 TABLET, DELAYED RELEASE ORAL at 06:04

## 2021-07-03 RX ADMIN — SODIUM CHLORIDE 3 MILLILITER(S): 9 INJECTION INTRAMUSCULAR; INTRAVENOUS; SUBCUTANEOUS at 13:12

## 2021-07-03 RX ADMIN — TAMSULOSIN HYDROCHLORIDE 0.4 MILLIGRAM(S): 0.4 CAPSULE ORAL at 21:41

## 2021-07-03 RX ADMIN — SODIUM CHLORIDE 3 MILLILITER(S): 9 INJECTION INTRAMUSCULAR; INTRAVENOUS; SUBCUTANEOUS at 21:43

## 2021-07-03 RX ADMIN — CHLORHEXIDINE GLUCONATE 1 APPLICATION(S): 213 SOLUTION TOPICAL at 20:38

## 2021-07-03 RX ADMIN — Medication 12.5 MILLIGRAM(S): at 17:15

## 2021-07-03 RX ADMIN — ATORVASTATIN CALCIUM 40 MILLIGRAM(S): 80 TABLET, FILM COATED ORAL at 21:41

## 2021-07-03 RX ADMIN — SODIUM CHLORIDE 3 MILLILITER(S): 9 INJECTION INTRAMUSCULAR; INTRAVENOUS; SUBCUTANEOUS at 06:04

## 2021-07-03 RX ADMIN — ENOXAPARIN SODIUM 40 MILLIGRAM(S): 100 INJECTION SUBCUTANEOUS at 05:44

## 2021-07-03 RX ADMIN — Medication 0.25 MILLIGRAM(S): at 13:48

## 2021-07-03 RX ADMIN — Medication 81 MILLIGRAM(S): at 12:13

## 2021-07-03 NOTE — PROGRESS NOTE ADULT - PROBLEM SELECTOR PLAN 3
Lab Facility: [id number] Cryotherapy Text: The wound bed was treated with cryotherapy after the biopsy was performed. Render Post-Care Instructions In Note?: no Additional Anesthesia Volume In Cc (Will Not Render If 0): 0 statin Anesthesia Type: 2% lidocaine with epinephrine Wound Care: Vaseline Electrodesiccation Text: The wound bed was treated with electrodesiccation after the biopsy was performed. Depth Of Biopsy: dermis Biopsy Type: H and E Type Of Destruction Used: Curettage Electrodesiccation And Curettage Text: The wound bed was treated with electrodesiccation and curettage after the biopsy was performed. Billing Type: Third-Party Bill Anesthesia Volume In Cc (Will Not Render If 0): 0.5 Dressing: bandage Post-Care Instructions: I reviewed with the patient in detail post-care instructions. Patient is to keep the biopsy site dry overnight, and then apply vaseline twice daily under a bandaid until healed. Silver Nitrate Text: The wound bed was treated with silver nitrate after the biopsy was performed. Was A Bandage Applied: Yes Biopsy Method: Personna blade Hemostasis: Drysol Curettage Text: The wound bed was treated with curettage after the biopsy was performed. Notification Instructions: Patient will be notified of biopsy results. However, patient instructed to call the office if not contacted within 2 weeks. Lab: [id number] Detail Level: Detailed Consent: Verbal consent was obtained and risks were reviewed including but not limited to scarring, infection, bleeding, scabbing, incomplete removal, nerve damage and allergy to anesthesia.

## 2021-07-03 NOTE — PROGRESS NOTE ADULT - SUBJECTIVE AND OBJECTIVE BOX
Cardiac Surgery Pre-op Note:    CC: Patient is a 70y old  Male who presents with a chief complaint of transfer for cabg eval (2021 22:35)      Referring Physician:                                                                                                           Surgeon:    Procedure: (Date) (Procedure)    Allergies    No Known Allergies    Intolerances        HPI:  70 y.o. 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 Lateral wall is moderately hypokinetic, stress ECG is positive for ischemia. Transferred to [facility] for CABG evaluation.      (2021 22:35)      PAST MEDICAL & SURGICAL HISTORY:  HTN (hypertension), benign    Hypercholesterolemia    Neuroendocrine carcinoma of pancreas  treated surgically 12 years ago    Neuroendocrine carcinoma of pancreas  treated surgically    History of hemorrhoidectomy        MEDICATIONS  (STANDING):  aspirin enteric coated 81 milliGRAM(s) Oral daily  atorvastatin 40 milliGRAM(s) Oral at bedtime  chlorhexidine 4% Liquid 1 Application(s) Topical daily  metoprolol tartrate 12.5 milliGRAM(s) Oral two times a day  pantoprazole    Tablet 40 milliGRAM(s) Oral before breakfast  sodium chloride 0.9% lock flush 3 milliLiter(s) IV Push every 8 hours  tamsulosin 0.4 milliGRAM(s) Oral at bedtime    MEDICATIONS  (PRN):        Labs:                        15.2   5.57  )-----------( 154      ( 2021 06:52 )             46.4     07-03    140  |  108  |  16  ----------------------------<  91  3.8   |  18<L>  |  1.02    Ca    9.7      2021 06:52  Phos  3.2     07-02  Mg     2.20     07-02    TPro  6.7  /  Alb  4.1  /  TBili  0.9  /  DBili  x   /  AST  18  /  ALT  16  /  AlkPhos  76  07-03    PT/INR - ( 2021 06:45 )   PT: 12.4 sec;   INR: 1.04 ratio         PTT - ( 2021 06:45 )  PTT:31.2 sec    Blood Type: ABO Interpretation: O ( @ 06:43)    HGB A1C:   Prealbumin: Prealbumin, Serum: 24 mg/dL ( @ 10:45)    Pro-BNP: Serum Pro-Brain Natriuretic Peptide: 57 pg/mL ( @ 06:52)    Thyroid Panel:   MRSA:  / MSSA:   Urinalysis Basic - ( 2021 05:26 )    Color: Yellow / Appearance: Clear / S.025 / pH: x  Gluc: x / Ketone: Negative  / Bili: Negative / Urobili: Negative   Blood: x / Protein: Trace / Nitrite: Negative   Leuk Esterase: Negative / RBC: x / WBC x   Sq Epi: x / Non Sq Epi: x / Bacteria: x        CXR:     EKG:    Carotid Duplex:      PFT's:    Echocardiogram:    Cardiac catheterization:    Vein Mapping:    Gen: WN/WD NAD  Neuro: AAOx3, nonfocal  Pulm: CTA B/L  CV: RRR, S1S2  Abd: Soft, NT, ND +BS  Ext: No edema, + peripheral pulses      Pt has AICD/PPM [ ] Yes  [ ] No             Brand Name:  Pre-op Beta Blocker ordered within 24 hrs of surgery (CABG ONLY)?  [ ] Yes  [ ] No  If not, Why?  Type & Cross  [ ] Yes  [ ] No  NPO after Midnight [ ] Yes  [ ] No  Pre-op ABX ordered, to be taped on chart:  [ ] Yes  [ ] No     Hibiclens/Peridex ordered [ ] Yes  [ ] No  Intraop on Hold: PRBCs, CXR, JULIA [ ]   Consent obtained  [ ] Yes  [ ] No   Cardiac Surgery Pre-op Note:    CC: Patient is a 70y old  Male who presents with a chief complaint of transfer for cabg eval (2021 22:35)      Referring Physician:                                                                                                           Surgeon: DR. Varma     Procedure: CABG with Dr. varma     Allergies: No Known Allergies        HPI:  70 y.o. male pmHX HTN, HLD, remote hx laparoscopic removal of neuroendocrine pancreatic tumor 14 years ago with 6 weeks of exertional chest pain and positive stress echo s/p [facility]  showing ostial LAD 80%, mid LAD 80%, pLCX 75% and recommend surgical evaluation, stress Echo 21 - 64%. Lateral wall is moderately hypokinetic, stress ECG is positive for ischemia. Transferred to [facility] for CABG evaluation.      (2021 22:35)      PAST MEDICAL & SURGICAL HISTORY:  HTN (hypertension), benign    Hypercholesterolemia    Neuroendocrine carcinoma of pancreas  treated surgically 12 years ago    Neuroendocrine carcinoma of pancreas  treated surgically    History of hemorrhoidectomy        MEDICATIONS  (STANDING):  aspirin enteric coated 81 milliGRAM(s) Oral daily  atorvastatin 40 milliGRAM(s) Oral at bedtime  chlorhexidine 4% Liquid 1 Application(s) Topical daily  metoprolol tartrate 12.5 milliGRAM(s) Oral two times a day  pantoprazole    Tablet 40 milliGRAM(s) Oral before breakfast  sodium chloride 0.9% lock flush 3 milliLiter(s) IV Push every 8 hours  tamsulosin 0.4 milliGRAM(s) Oral at bedtime    MEDICATIONS  (PRN):        Labs:                        15.2   5.57  )-----------( 154      ( 2021 06:52 )             46.4     07-03    140  |  108  |  16  ----------------------------<  91  3.8   |  18<L>  |  1.02    Ca    9.7      2021 06:52  Phos  3.2     07-02  Mg     2.20     07-02    TPro  6.7  /  Alb  4.1  /  TBili  0.9  /  DBili  x   /  AST  18  /  ALT  16  /  AlkPhos  76  07-03    PT/INR - ( 2021 06:45 )   PT: 12.4 sec;   INR: 1.04 ratio         PTT - ( 2021 06:45 )  PTT:31.2 sec    Blood Type: ABO Interpretation: O ( @ 06:43)    HGB A1C:   Prealbumin: Prealbumin, Serum: 24 mg/dL ( @ 10:45)    Pro-BNP: Serum Pro-Brain Natriuretic Peptide: 57 pg/mL ( @ 06:52)    Thyroid Panel:   MRSA:  / MSSA:   Urinalysis Basic - ( 2021 05:26 )    Color: Yellow / Appearance: Clear / S.025 / pH: x  Gluc: x / Ketone: Negative  / Bili: Negative / Urobili: Negative   Blood: x / Protein: Trace / Nitrite: Negative   Leuk Esterase: Negative / RBC: x / WBC x   Sq Epi: x / Non Sq Epi: x / Bacteria: x        CXR:  nl    EKG: rsr 50-80     Carotid Duplex:  not performed     PFT's: not performed    Echocardiogram: negative 7/3    Cardiac catheterization: + 3vd       Gen: WN/WD NAD  Neuro: AAOx3, nonfocal  Pulm: CTA B/L  CV: RRR, S1S2  Abd: Soft, NT, ND +BS  Ext: No edema, + peripheral pulses      Pt has AICD/PPM [ ] Yes  [x ] No             Brand Name:  Pre-op Beta Blocker ordered within 24 hrs of surgery (CABG ONLY)?  [x ] Yes  [ ] No  If not, Why?  Type & Cross  [x ] Yes  [ ] No  NPO after Midnight [x ] Yes  [ ] No  Pre-op ABX ordered, to be taped on chart:  [x ] Yes  [ ] No     Hibiclens/Peridex ordered [x ] Yes  [ ] No  Intraop on Hold: PRBCs, CXR, JULIA [x ]   Consent obtained  [x ] Yes  [ ] No

## 2021-07-03 NOTE — PROGRESS NOTE ADULT - PROBLEM SELECTOR PLAN 1
preop cardiac surgery evaluation in progress  Asa81, statin, start beta-blockade therapy  CXR, UA, MRSA,   TTE preop cardiac surgery evaluation in progress  Asa81, statin, start beta-blockade therapy  echo done- neg valvular disease; type and cross available, covid pcr neg; p2y12 158   npo after midnight for OR 7/4- CABG with DR. Westfall

## 2021-07-03 NOTE — PROGRESS NOTE ADULT - ASSESSMENT
70 y.o. 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 Lateral wall is moderately hypokinetic, stress ECG is positive for ischemia. Transferred to [facility] for CABG evaluation.    70 y.o. 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 Lateral wall is moderately hypokinetic, stress ECG is positive for ischemia. Transferred to [facility] for CABG evaluation.   7/3 preop workup in progress; echo done- neg valvular disease; type and cross available, covid pcr neg; p2y12 158   npo after midnight for OR 7/4- CABG with DR. Westfall

## 2021-07-04 ENCOUNTER — APPOINTMENT (OUTPATIENT)
Dept: CARDIOTHORACIC SURGERY | Facility: HOSPITAL | Age: 71
End: 2021-07-04
Payer: MEDICARE

## 2021-07-04 LAB
ALBUMIN SERPL ELPH-MCNC: 4 G/DL — SIGNIFICANT CHANGE UP (ref 3.3–5)
ALP SERPL-CCNC: 52 U/L — SIGNIFICANT CHANGE UP (ref 40–120)
ALT FLD-CCNC: 14 U/L — SIGNIFICANT CHANGE UP (ref 10–45)
ANION GAP SERPL CALC-SCNC: 17 MMOL/L — SIGNIFICANT CHANGE UP (ref 5–17)
APTT BLD: 29.5 SEC — SIGNIFICANT CHANGE UP (ref 27.5–35.5)
AST SERPL-CCNC: 16 U/L — SIGNIFICANT CHANGE UP (ref 10–40)
BASE EXCESS BLDV CALC-SCNC: -4 MMOL/L — LOW (ref -2–2)
BASE EXCESS BLDV CALC-SCNC: 0.2 MMOL/L — SIGNIFICANT CHANGE UP (ref -2–2)
BILIRUB SERPL-MCNC: 1.1 MG/DL — SIGNIFICANT CHANGE UP (ref 0.2–1.2)
BUN SERPL-MCNC: 17 MG/DL — SIGNIFICANT CHANGE UP (ref 7–23)
CA-I SERPL-SCNC: 1.08 MMOL/L — LOW (ref 1.12–1.3)
CA-I SERPL-SCNC: 1.1 MMOL/L — LOW (ref 1.12–1.3)
CALCIUM SERPL-MCNC: 9.5 MG/DL — SIGNIFICANT CHANGE UP (ref 8.4–10.5)
CHLORIDE BLDV-SCNC: 108 MMOL/L — SIGNIFICANT CHANGE UP (ref 96–108)
CHLORIDE BLDV-SCNC: 110 MMOL/L — HIGH (ref 96–108)
CHLORIDE SERPL-SCNC: 108 MMOL/L — SIGNIFICANT CHANGE UP (ref 96–108)
CLOSURE TME COLL+EPINEP BLD: 97 K/UL — LOW (ref 150–400)
CO2 BLDV-SCNC: 23 MMOL/L — SIGNIFICANT CHANGE UP (ref 22–30)
CO2 BLDV-SCNC: 26 MMOL/L — SIGNIFICANT CHANGE UP (ref 22–30)
CO2 SERPL-SCNC: 19 MMOL/L — LOW (ref 22–31)
CREAT SERPL-MCNC: 0.99 MG/DL — SIGNIFICANT CHANGE UP (ref 0.5–1.3)
FIBRINOGEN PPP-MCNC: 299 MG/DL — SIGNIFICANT CHANGE UP (ref 290–520)
GAS PNL BLDA: SIGNIFICANT CHANGE UP
GAS PNL BLDV: 139 MMOL/L — SIGNIFICANT CHANGE UP (ref 135–145)
GAS PNL BLDV: 139 MMOL/L — SIGNIFICANT CHANGE UP (ref 135–145)
GAS PNL BLDV: SIGNIFICANT CHANGE UP
GLUCOSE BLDC GLUCOMTR-MCNC: 107 MG/DL — HIGH (ref 70–99)
GLUCOSE BLDC GLUCOMTR-MCNC: 108 MG/DL — HIGH (ref 70–99)
GLUCOSE BLDC GLUCOMTR-MCNC: 122 MG/DL — HIGH (ref 70–99)
GLUCOSE BLDC GLUCOMTR-MCNC: 134 MG/DL — HIGH (ref 70–99)
GLUCOSE BLDC GLUCOMTR-MCNC: 136 MG/DL — HIGH (ref 70–99)
GLUCOSE BLDC GLUCOMTR-MCNC: 148 MG/DL — HIGH (ref 70–99)
GLUCOSE BLDC GLUCOMTR-MCNC: 149 MG/DL — HIGH (ref 70–99)
GLUCOSE BLDC GLUCOMTR-MCNC: 156 MG/DL — HIGH (ref 70–99)
GLUCOSE BLDC GLUCOMTR-MCNC: 156 MG/DL — HIGH (ref 70–99)
GLUCOSE BLDV-MCNC: 107 MG/DL — HIGH (ref 70–99)
GLUCOSE BLDV-MCNC: 114 MG/DL — HIGH (ref 70–99)
GLUCOSE SERPL-MCNC: 138 MG/DL — HIGH (ref 70–99)
HCO3 BLDV-SCNC: 21 MMOL/L — SIGNIFICANT CHANGE UP (ref 21–29)
HCO3 BLDV-SCNC: 25 MMOL/L — SIGNIFICANT CHANGE UP (ref 21–29)
HCT VFR BLD CALC: 35.9 % — LOW (ref 39–50)
HCT VFR BLD CALC: 36.6 % — LOW (ref 39–50)
HCT VFR BLDA CALC: 35 % — LOW (ref 39–50)
HCT VFR BLDA CALC: 39 % — SIGNIFICANT CHANGE UP (ref 39–50)
HEPARINASE TEG R TIME: 5.7 MIN — SIGNIFICANT CHANGE UP (ref 4.3–8.3)
HGB BLD CALC-MCNC: 11.3 G/DL — LOW (ref 13–17)
HGB BLD CALC-MCNC: 12.5 G/DL — LOW (ref 13–17)
HGB BLD-MCNC: 12 G/DL — LOW (ref 13–17)
HGB BLD-MCNC: 12.2 G/DL — LOW (ref 13–17)
INR BLD: 1.25 RATIO — HIGH (ref 0.88–1.16)
LACTATE BLDV-MCNC: 1.2 MMOL/L — SIGNIFICANT CHANGE UP (ref 0.7–2)
LACTATE BLDV-MCNC: 1.6 MMOL/L — SIGNIFICANT CHANGE UP (ref 0.7–2)
MAGNESIUM SERPL-MCNC: 2.6 MG/DL — SIGNIFICANT CHANGE UP (ref 1.6–2.6)
MCHC RBC-ENTMCNC: 30 PG — SIGNIFICANT CHANGE UP (ref 27–34)
MCHC RBC-ENTMCNC: 30.3 PG — SIGNIFICANT CHANGE UP (ref 27–34)
MCHC RBC-ENTMCNC: 33.3 GM/DL — SIGNIFICANT CHANGE UP (ref 32–36)
MCHC RBC-ENTMCNC: 33.4 GM/DL — SIGNIFICANT CHANGE UP (ref 32–36)
MCV RBC AUTO: 90.1 FL — SIGNIFICANT CHANGE UP (ref 80–100)
MCV RBC AUTO: 90.7 FL — SIGNIFICANT CHANGE UP (ref 80–100)
NRBC # BLD: 0 /100 WBCS — SIGNIFICANT CHANGE UP (ref 0–0)
NRBC # BLD: 0 /100 WBCS — SIGNIFICANT CHANGE UP (ref 0–0)
OTHER CELLS CSF MANUAL: 13 ML/DL — LOW (ref 18–22)
OTHER CELLS CSF MANUAL: 16 ML/DL — LOW (ref 18–22)
PCO2 BLDV: 42 MMHG — SIGNIFICANT CHANGE UP (ref 35–50)
PCO2 BLDV: 43 MMHG — SIGNIFICANT CHANGE UP (ref 35–50)
PH BLDV: 7.32 — LOW (ref 7.35–7.45)
PH BLDV: 7.38 — SIGNIFICANT CHANGE UP (ref 7.35–7.45)
PHOSPHATE SERPL-MCNC: 3.3 MG/DL — SIGNIFICANT CHANGE UP (ref 2.5–4.5)
PLATELET # BLD AUTO: 96 K/UL — LOW (ref 150–400)
PLATELET # BLD AUTO: SIGNIFICANT CHANGE UP K/UL (ref 150–400)
PO2 BLDV: 50 MMHG — HIGH (ref 25–45)
PO2 BLDV: 67 MMHG — HIGH (ref 25–45)
POTASSIUM BLDV-SCNC: 3.6 MMOL/L — SIGNIFICANT CHANGE UP (ref 3.5–5.3)
POTASSIUM BLDV-SCNC: 4.2 MMOL/L — SIGNIFICANT CHANGE UP (ref 3.5–5.3)
POTASSIUM SERPL-MCNC: 4.1 MMOL/L — SIGNIFICANT CHANGE UP (ref 3.5–5.3)
POTASSIUM SERPL-SCNC: 4.1 MMOL/L — SIGNIFICANT CHANGE UP (ref 3.5–5.3)
PROT SERPL-MCNC: 5.9 G/DL — LOW (ref 6–8.3)
PROTHROM AB SERPL-ACNC: 14.8 SEC — HIGH (ref 10.6–13.6)
RAPIDTEG MAXIMUM AMPLITUDE: 61.1 MM — SIGNIFICANT CHANGE UP (ref 52–70)
RBC # BLD: 3.96 M/UL — LOW (ref 4.2–5.8)
RBC # BLD: 4.06 M/UL — LOW (ref 4.2–5.8)
RBC # FLD: 12.7 % — SIGNIFICANT CHANGE UP (ref 10.3–14.5)
RBC # FLD: 12.8 % — SIGNIFICANT CHANGE UP (ref 10.3–14.5)
SAO2 % BLDV: 85 % — SIGNIFICANT CHANGE UP (ref 67–88)
SAO2 % BLDV: 92 % — HIGH (ref 67–88)
SODIUM SERPL-SCNC: 144 MMOL/L — SIGNIFICANT CHANGE UP (ref 135–145)
TEG FUNCTIONAL FIBRINOGEN: 20.7 MM — SIGNIFICANT CHANGE UP (ref 15–32)
TEG MAXIMUM AMPLITUDE: 60.4 MM — SIGNIFICANT CHANGE UP (ref 52–69)
TEG REACTION TIME: 6.5 MIN — SIGNIFICANT CHANGE UP (ref 4.6–9.1)
WBC # BLD: 10.32 K/UL — SIGNIFICANT CHANGE UP (ref 3.8–10.5)
WBC # BLD: 9.77 K/UL — SIGNIFICANT CHANGE UP (ref 3.8–10.5)
WBC # FLD AUTO: 10.32 K/UL — SIGNIFICANT CHANGE UP (ref 3.8–10.5)
WBC # FLD AUTO: 9.77 K/UL — SIGNIFICANT CHANGE UP (ref 3.8–10.5)

## 2021-07-04 PROCEDURE — 99291 CRITICAL CARE FIRST HOUR: CPT

## 2021-07-04 PROCEDURE — 33534 CABG ARTERIAL TWO: CPT

## 2021-07-04 PROCEDURE — 71045 X-RAY EXAM CHEST 1 VIEW: CPT | Mod: 26

## 2021-07-04 PROCEDURE — 93010 ELECTROCARDIOGRAM REPORT: CPT

## 2021-07-04 RX ORDER — POTASSIUM CHLORIDE 20 MEQ
10 PACKET (EA) ORAL
Refills: 0 | Status: COMPLETED | OUTPATIENT
Start: 2021-07-04 | End: 2021-07-04

## 2021-07-04 RX ORDER — ASPIRIN/CALCIUM CARB/MAGNESIUM 324 MG
81 TABLET ORAL DAILY
Refills: 0 | Status: DISCONTINUED | OUTPATIENT
Start: 2021-07-04 | End: 2021-07-07

## 2021-07-04 RX ORDER — MEPERIDINE HYDROCHLORIDE 50 MG/ML
25 INJECTION INTRAMUSCULAR; INTRAVENOUS; SUBCUTANEOUS ONCE
Refills: 0 | Status: DISCONTINUED | OUTPATIENT
Start: 2021-07-04 | End: 2021-07-04

## 2021-07-04 RX ORDER — DEXMEDETOMIDINE HYDROCHLORIDE IN 0.9% SODIUM CHLORIDE 4 UG/ML
0.23 INJECTION INTRAVENOUS
Qty: 200 | Refills: 0 | Status: DISCONTINUED | OUTPATIENT
Start: 2021-07-04 | End: 2021-07-05

## 2021-07-04 RX ORDER — HYDROMORPHONE HYDROCHLORIDE 2 MG/ML
0.5 INJECTION INTRAMUSCULAR; INTRAVENOUS; SUBCUTANEOUS ONCE
Refills: 0 | Status: DISCONTINUED | OUTPATIENT
Start: 2021-07-04 | End: 2021-07-04

## 2021-07-04 RX ORDER — POTASSIUM CHLORIDE 20 MEQ
10 PACKET (EA) ORAL
Refills: 0 | Status: DISCONTINUED | OUTPATIENT
Start: 2021-07-04 | End: 2021-07-05

## 2021-07-04 RX ORDER — DEXTROSE 50 % IN WATER 50 %
25 SYRINGE (ML) INTRAVENOUS
Refills: 0 | Status: DISCONTINUED | OUTPATIENT
Start: 2021-07-04 | End: 2021-07-06

## 2021-07-04 RX ORDER — INSULIN HUMAN 100 [IU]/ML
5 INJECTION, SOLUTION SUBCUTANEOUS
Qty: 100 | Refills: 0 | Status: DISCONTINUED | OUTPATIENT
Start: 2021-07-04 | End: 2021-07-05

## 2021-07-04 RX ORDER — CEFUROXIME AXETIL 250 MG
1500 TABLET ORAL EVERY 8 HOURS
Refills: 0 | Status: COMPLETED | OUTPATIENT
Start: 2021-07-04 | End: 2021-07-05

## 2021-07-04 RX ORDER — POTASSIUM CHLORIDE 20 MEQ
10 PACKET (EA) ORAL
Refills: 0 | Status: COMPLETED | OUTPATIENT
Start: 2021-07-04 | End: 2021-07-05

## 2021-07-04 RX ORDER — CHLORHEXIDINE GLUCONATE 213 G/1000ML
5 SOLUTION TOPICAL
Refills: 0 | Status: DISCONTINUED | OUTPATIENT
Start: 2021-07-04 | End: 2021-07-06

## 2021-07-04 RX ORDER — OXYCODONE AND ACETAMINOPHEN 5; 325 MG/1; MG/1
2 TABLET ORAL EVERY 6 HOURS
Refills: 0 | Status: DISCONTINUED | OUTPATIENT
Start: 2021-07-04 | End: 2021-07-05

## 2021-07-04 RX ORDER — ACETAMINOPHEN 500 MG
1000 TABLET ORAL ONCE
Refills: 0 | Status: COMPLETED | OUTPATIENT
Start: 2021-07-04 | End: 2021-07-04

## 2021-07-04 RX ORDER — DEXTROSE 50 % IN WATER 50 %
50 SYRINGE (ML) INTRAVENOUS
Refills: 0 | Status: DISCONTINUED | OUTPATIENT
Start: 2021-07-04 | End: 2021-07-06

## 2021-07-04 RX ORDER — ATORVASTATIN CALCIUM 80 MG/1
40 TABLET, FILM COATED ORAL AT BEDTIME
Refills: 0 | Status: DISCONTINUED | OUTPATIENT
Start: 2021-07-04 | End: 2021-07-07

## 2021-07-04 RX ORDER — NICARDIPINE HYDROCHLORIDE 30 MG/1
5 CAPSULE, EXTENDED RELEASE ORAL
Qty: 40 | Refills: 0 | Status: DISCONTINUED | OUTPATIENT
Start: 2021-07-04 | End: 2021-07-05

## 2021-07-04 RX ORDER — MAGNESIUM SULFATE 500 MG/ML
2 VIAL (ML) INJECTION ONCE
Refills: 0 | Status: COMPLETED | OUTPATIENT
Start: 2021-07-04 | End: 2021-07-04

## 2021-07-04 RX ORDER — ALBUMIN HUMAN 25 %
250 VIAL (ML) INTRAVENOUS
Refills: 0 | Status: DISCONTINUED | OUTPATIENT
Start: 2021-07-04 | End: 2021-07-04

## 2021-07-04 RX ORDER — PANTOPRAZOLE SODIUM 20 MG/1
40 TABLET, DELAYED RELEASE ORAL
Refills: 0 | Status: DISCONTINUED | OUTPATIENT
Start: 2021-07-04 | End: 2021-07-07

## 2021-07-04 RX ORDER — OXYCODONE AND ACETAMINOPHEN 5; 325 MG/1; MG/1
1 TABLET ORAL EVERY 4 HOURS
Refills: 0 | Status: DISCONTINUED | OUTPATIENT
Start: 2021-07-04 | End: 2021-07-05

## 2021-07-04 RX ORDER — SODIUM CHLORIDE 9 MG/ML
1000 INJECTION INTRAMUSCULAR; INTRAVENOUS; SUBCUTANEOUS
Refills: 0 | Status: DISCONTINUED | OUTPATIENT
Start: 2021-07-04 | End: 2021-07-06

## 2021-07-04 RX ORDER — CHLORHEXIDINE GLUCONATE 213 G/1000ML
15 SOLUTION TOPICAL EVERY 12 HOURS
Refills: 0 | Status: DISCONTINUED | OUTPATIENT
Start: 2021-07-04 | End: 2021-07-04

## 2021-07-04 RX ADMIN — CHLORHEXIDINE GLUCONATE 5 MILLILITER(S): 213 SOLUTION TOPICAL at 17:42

## 2021-07-04 RX ADMIN — SODIUM CHLORIDE 3 MILLILITER(S): 9 INJECTION INTRAMUSCULAR; INTRAVENOUS; SUBCUTANEOUS at 07:00

## 2021-07-04 RX ADMIN — Medication 500 MILLILITER(S): at 13:52

## 2021-07-04 RX ADMIN — Medication 100 MILLIEQUIVALENT(S): at 14:06

## 2021-07-04 RX ADMIN — Medication 50 GRAM(S): at 14:44

## 2021-07-04 RX ADMIN — HYDROMORPHONE HYDROCHLORIDE 0.5 MILLIGRAM(S): 2 INJECTION INTRAMUSCULAR; INTRAVENOUS; SUBCUTANEOUS at 15:30

## 2021-07-04 RX ADMIN — HYDROMORPHONE HYDROCHLORIDE 0.5 MILLIGRAM(S): 2 INJECTION INTRAMUSCULAR; INTRAVENOUS; SUBCUTANEOUS at 19:45

## 2021-07-04 RX ADMIN — PANTOPRAZOLE SODIUM 40 MILLIGRAM(S): 20 TABLET, DELAYED RELEASE ORAL at 07:05

## 2021-07-04 RX ADMIN — Medication 125 MILLILITER(S): at 22:20

## 2021-07-04 RX ADMIN — Medication 400 MILLIGRAM(S): at 17:24

## 2021-07-04 RX ADMIN — Medication 100 MILLIGRAM(S): at 15:44

## 2021-07-04 RX ADMIN — Medication 100 MILLIEQUIVALENT(S): at 23:15

## 2021-07-04 RX ADMIN — HYDROMORPHONE HYDROCHLORIDE 0.5 MILLIGRAM(S): 2 INJECTION INTRAMUSCULAR; INTRAVENOUS; SUBCUTANEOUS at 19:32

## 2021-07-04 RX ADMIN — DEXMEDETOMIDINE HYDROCHLORIDE IN 0.9% SODIUM CHLORIDE 5 MICROGRAM(S)/KG/HR: 4 INJECTION INTRAVENOUS at 14:06

## 2021-07-04 RX ADMIN — HYDROMORPHONE HYDROCHLORIDE 0.5 MILLIGRAM(S): 2 INJECTION INTRAMUSCULAR; INTRAVENOUS; SUBCUTANEOUS at 15:00

## 2021-07-04 RX ADMIN — Medication 100 MILLIEQUIVALENT(S): at 18:48

## 2021-07-04 RX ADMIN — HYDROMORPHONE HYDROCHLORIDE 0.5 MILLIGRAM(S): 2 INJECTION INTRAMUSCULAR; INTRAVENOUS; SUBCUTANEOUS at 15:45

## 2021-07-04 RX ADMIN — Medication 1000 MILLIGRAM(S): at 17:40

## 2021-07-04 RX ADMIN — Medication 100 MILLIGRAM(S): at 23:54

## 2021-07-04 RX ADMIN — Medication 0.25 MILLIGRAM(S): at 00:00

## 2021-07-04 RX ADMIN — Medication 100 MILLIEQUIVALENT(S): at 17:41

## 2021-07-04 RX ADMIN — Medication 12.5 MILLIGRAM(S): at 07:05

## 2021-07-04 RX ADMIN — Medication 100 MILLIEQUIVALENT(S): at 14:04

## 2021-07-04 RX ADMIN — Medication 500 MILLILITER(S): at 15:12

## 2021-07-04 RX ADMIN — Medication 81 MILLIGRAM(S): at 18:33

## 2021-07-04 RX ADMIN — ATORVASTATIN CALCIUM 40 MILLIGRAM(S): 80 TABLET, FILM COATED ORAL at 21:22

## 2021-07-04 RX ADMIN — HYDROMORPHONE HYDROCHLORIDE 0.5 MILLIGRAM(S): 2 INJECTION INTRAMUSCULAR; INTRAVENOUS; SUBCUTANEOUS at 14:40

## 2021-07-04 RX ADMIN — Medication 100 MILLIEQUIVALENT(S): at 18:17

## 2021-07-04 RX ADMIN — Medication 100 MILLIEQUIVALENT(S): at 14:50

## 2021-07-04 RX ADMIN — Medication 500 MILLILITER(S): at 13:51

## 2021-07-04 RX ADMIN — Medication 100 MILLIEQUIVALENT(S): at 22:20

## 2021-07-04 NOTE — PRE-ANESTHESIA EVALUATION ADULT - NSANTHOSAYNRD_GEN_A_CORE
No. JENA screening performed.  STOP BANG Legend: 0-2 = LOW Risk; 3-4 = INTERMEDIATE Risk; 5-8 = HIGH Risk

## 2021-07-04 NOTE — PRE-ANESTHESIA EVALUATION ADULT - NSANTHPMHFT_GEN_ALL_CORE
71 yo M with pmhx of HTN, HLD, Neuroendocrine tumor of pancreas s/p resection 14 years ago (follows up yearly; MRI neg for recurrence, no sequelae) who was experiencing "chest burning" with exertion for 6 weeks. Stress test and cath + for CAD. EF nml, no significant valvular disease. NPO, received COVID vaccine, denies any chest pain or shortness of breath. All questions and concerns addressed.

## 2021-07-04 NOTE — PRE-OP CHECKLIST - MUPIRONCIN COMMENTS
[facility] at 2030 and 7/4/21 at HIBICLENS SHOWER DONE @[facility] 7/3/21 at 2030 and 7/4/21 at 0656

## 2021-07-04 NOTE — PRE-OP CHECKLIST - RESPIRATORY RATE (BREATHS/MIN)
Subjective:       Patient ID: Lamar Kiser is a 50 y.o. female.    Chief Complaint: No chief complaint on file.    The patient location is:   The chief complaint leading to consultation is:     Visit type: audiovisual    Face to Face time with patient:    minutes of total time spent on the encounter, which includes face to face time and non-face to face time preparing to see the patient (eg, review of tests), Obtaining and/or reviewing separately obtained history, Documenting clinical information in the electronic or other health record, Independently interpreting results (not separately reported) and communicating results to the patient/family/caregiver, or Care coordination (not separately reported).         Each patient to whom he or she provides medical services by telemedicine is:  (1) informed of the relationship between the physician and patient and the respective role of any other health care provider with respect to management of the patient; and (2) notified that he or she may decline to receive medical services by telemedicine and may withdraw from such care at any time.    Notes:       Notes:    checked today- diethylpropion last filled 3/8/2020, has 1 rf remaining    204 lbs last OV, stated weight 205#    Prev 204#  We were able to get rybelsus approved. Sweets cravings have been her main issue. Has not taken the diethylpropion consistently.                    Review of Systems   Constitutional: Negative for chills and fever.   Respiratory: Negative for shortness of breath.         Mild snoring   Cardiovascular: Negative for chest pain and leg swelling.   Gastrointestinal: Negative for constipation and diarrhea.        + GERD   Genitourinary: Negative for difficulty urinating.        S/p hyst   Musculoskeletal: Positive for arthralgias. Negative for back pain.        Elbow and wrist   Neurological: Negative for dizziness and light-headedness.   Psychiatric/Behavioral: Negative for  dysphoric mood. The patient is not nervous/anxious.        Objective:     LMP 03/30/2013     Physical Exam   Constitutional: She is oriented to person, place, and time. She appears well-developed. No distress.   Obesity     HENT:   Head: Normocephalic and atraumatic.   No murmur heard.  Skin: Skin is warm and dry. No erythema.   Psychiatric: She has a normal mood and affect. Her behavior is normal. Judgment normal.   Vitals reviewed.      Assessment:       No diagnosis found.    Plan:           There are no diagnoses linked to this encounter.   You can use the diethylpropion that you have and its refill.     Rybelsus in the morning before you eat.     1200 calories a day  40% carbs (120 grams)  30% protein (90 grams)  30 % fat (40 grams)  Food diary  Exercise- work on increasing. [employer], [employer] and [employer] are great sources for free work out plans and videos. These can be body weight exercises, light weight or elastic bands.       30 min recipes given      This encounter was created in error - please disregard.   18

## 2021-07-04 NOTE — PRE-ANESTHESIA EVALUATION ADULT - NSANTHINDVINFOSD_GEN_ALL_CORE
Referring Physician: Dr. Deshawn Ireland  Diagnosis:  H/O breast reconstruction (H73.831)    Date of onset: 4/15/2020 Evaluation Date: 12/23/2020           Physical Therapy Lymphedema Daily Note    Precautions:  Breast cancer   Education or treatment limitation: Manual Therapy (30 minutes)  MLD: neck sequence, abd sequence, R inguinal, R A-I, R axilla, R breast and abdominal area (directing to R axilla and R inguinal: pt risk for lymphedema is the left side)    Initiated self MLD instructions    Compression:  - various vendors that can be utilized            Assessment: Reinforced again that stretches should be no pain but rather pulling or slight discomfort. Goals:    To be met in 10 visits:  1) Decrease swelling trunk/breast/abd areas by a total of 4 cm to patient

## 2021-07-04 NOTE — PROGRESS NOTE ADULT - SUBJECTIVE AND OBJECTIVE BOX
JAH TORREZ  MRN-[id number]  Patient is a 70y old  Male who presents with a chief complaint of transfer for cabg eval (2021 11:24)    HPI:  70 y.o. 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 Lateral wall is moderately hypokinetic, stress ECG is positive for ischemia. Transferred to [facility] for CABG evaluation.      (2021 22:35)      Surgery/Hospital course:   Coronary Artery Bypass Graft     Today/Overnight:  Patient with history of coronary artery disease, subsequently underwent coronary artery bypass grafting procedure earlier today. Patient received no products intraoperative and is currently sedated.    Vital Signs Last 24 Hrs  T(C): 36.5 (2021 16:00), Max: 36.7 (2021 20:27)  T(F): 97.7 (2021 16:00), Max: 98.1 (2021 20:27)  HR: 73 (2021 17:00) (60 - 74)  BP: 128/81 (2021 07:16) (123/80 - 128/81)  BP(mean): 96 (2021 07:16) (96 - 96)  RR: 14 (2021 17:00) (14 - 18)  SpO2: 95% (2021 17:00) (92% - 100%)  ============================I/O===========================  I&O's Summary    2021 07:01  -  2021 07:00  --------------------------------------------------------  IN: 620 mL / OUT: 1000 mL / NET: -380 mL    2021 07:01  -  2021 17:08  --------------------------------------------------------  IN: 1111.2 mL / OUT: 1335 mL / NET: -223.8 mL      ============================ LABS =========================                        12.0   9.77  )-----------( x        ( 2021 16:51 )             35.9     07-04    144  |  108  |  17  ----------------------------<  138<H>  4.1   |  19<L>  |  0.99    Ca    9.5      2021 13:42  Phos  3.3     07-04  Mg     2.6     07-04    TPro  5.9<L>  /  Alb  4.0  /  TBili  1.1  /  DBili  x   /  AST  16  /  ALT  14  /  AlkPhos  52  07-04    LIVER FUNCTIONS - ( 2021 13:42 )  Alb: 4.0 g/dL / Pro: 5.9 g/dL / ALK PHOS: 52 U/L / ALT: 14 U/L / AST: 16 U/L / GGT: x           PT/INR - ( 2021 13:41 )   PT: 14.8 sec;   INR: 1.25 ratio         PTT - ( 2021 13:41 )  PTT:29.5 sec  ABG - ( 2021 15:05 )  pH, Arterial: 7.37  pH, Blood: x     /  pCO2: 39    /  pO2: 80    / HCO3: 22    / Base Excess: -2.0  /  SaO2: 95                Urinalysis Basic - ( 2021 05:26 )    Color: Yellow / Appearance: Clear / S.025 / pH: x  Gluc: x / Ketone: Negative  / Bili: Negative / Urobili: Negative   Blood: x / Protein: Trace / Nitrite: Negative   Leuk Esterase: Negative / RBC: x / WBC x   Sq Epi: x / Non Sq Epi: x / Bacteria: x      ======================Micro/Rad/Cardio=================  CXR: Reviewed  Echo: Reviewed  ======================================================  PAST MEDICAL & SURGICAL HISTORY:  HTN (hypertension), benign    Hypercholesterolemia    Neuroendocrine carcinoma of pancreas  treated surgically 12 years ago    Neuroendocrine carcinoma of pancreas  treated surgically    History of hemorrhoidectomy      ========================ASSESSMENT ================  Coronary Artery Disease Status Post Coronary Artery Bypass Graft Procedure with Transesophageal Echocardiogram on 2021  Hypertension   Hypercholesterolemia     Plan:  ====================== NEUROLOGY=====================  Addressed analgesic regimen to optimize function and sedated for ventilatory synchrony.     dexMEDEtomidine Infusion 0.229 MICROgram(s)/kG/Hr (5 mL/Hr) IV Continuous <Continuous>  meperidine     Injectable 25 milliGRAM(s) IV Push once  oxycodone    5 mG/acetaminophen 325 mG 1 Tablet(s) Oral every 4 hours PRN Mild Pain (1 - 3)  oxycodone    5 mG/acetaminophen 325 mG 2 Tablet(s) Oral every 6 hours PRN Moderate Pain (4 - 6)    ==================== RESPIRATORY======================  Respiratory status required full ventilatory support, close monitoring of respiratory rate and breathing pattern, the following of ABG’s with A-line monitoring, continuous pulse oximetry monitoring.    Mechanical Ventilation:  Mode: AC/ CMV (Assist Control/ Continuous Mandatory Ventilation)  RR (machine): 14  TV (machine): 600  FiO2: 50  PEEP: 5  ITime: 1  MAP: 10  PIP: 16      ====================CARDIOVASCULAR==================  Patient with history of coronary artery disease, subsequently underwent coronary artery bypass grafting procedure earlier today. Patient's most recent lactate is 1.3. Invasive hemodynamic monitoring with a central venous catheter & an A-line were required for the continuous central venous and MAP/BP monitoring to ensure adequate cardiovascular support.    ===================HEMATOLOGIC/ONC ===================  Monitor hemoglobin and hematocrit levels.    ===================== RENAL =========================  Optimize renal perfusion with adequate volume resuscitation and continued monitoring of urine output, fluid balance, BUN/Creatinine.     ==================== GASTROINTESTINAL===================  NPO after recent procedure, advance diet as tolerated.     albumin human  5% IVPB 250 milliLiter(s) IV Intermittent every 30 minutes  potassium chloride  10 mEq/50 mL IVPB 10 milliEquivalent(s) IV Intermittent every 1 hour  potassium chloride  10 mEq/50 mL IVPB 10 milliEquivalent(s) IV Intermittent every 1 hour  potassium chloride  10 mEq/50 mL IVPB 10 milliEquivalent(s) IV Intermittent every 1 hour  sodium chloride 0.9%. 1000 milliLiter(s) (10 mL/Hr) IV Continuous <Continuous>    =======================    ENDOCRINE  =====================  No acute issues, monitor glucose for need to initiate sliding scale.    dextrose 50% Injectable 50 milliLiter(s) IV Push every 15 minutes    ========================INFECTIOUS DISEASE================  Afebrile, White Blood Cells within normal limits. Continue Cefuroxime for perioperative antibiotic coverage.    cefuroxime  IVPB 1500 milliGRAM(s) IV Intermittent every 8 hours      Patient requires continuous monitoring with bedside rhythm monitoring, pulse oximetry monitoring, and continuous central venous and arterial pressure monitoring; and intermittent blood gas analysis.  Care plan discussed with ICU care team.    Patient remained critical, at risk for life threatening decompensation.   I have spent 35 minutes providing acute care with multiple re-evaluations throughout the evening.     By signing my name below, I, Ava Andres , attest that this documentation has been prepared under the direction and in the presence of Micheline White MD   Electronically signed: Ama Thomas, 21 @ 17:08    I, Micheline White, personally performed the services described in this documentation. All medical record entries made by the scribe were at my direction and in my presence. I have reviewed the chart and agree that the record reflects my personal performance and is accurate and complete  Electronically signed: Micheline White MD 21 @ 17:08 JAH TORRZE  MRN-[id number]  Patient is a 70y old  Male who presents with a chief complaint of transfer for cabg eval (2021 11:24)    HPI:  70 y.o. 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 Lateral wall is moderately hypokinetic, stress ECG is positive for ischemia. Transferred to [facility] for CABG evaluation.      (2021 22:35)      Surgery/Hospital course:   Coronary Artery Bypass Graft     Today/Overnight:  Patient with history of coronary artery disease, subsequently underwent coronary artery bypass grafting procedure earlier today.    Vital Signs Last 24 Hrs  T(C): 36.5 (2021 16:00), Max: 36.7 (2021 20:27)  T(F): 97.7 (2021 16:00), Max: 98.1 (2021 20:27)  HR: 73 (2021 17:00) (60 - 74)  BP: 128/81 (2021 07:16) (123/80 - 128/81)  BP(mean): 96 (2021 07:16) (96 - 96)  RR: 14 (2021 17:00) (14 - 18)  SpO2: 95% (2021 17:00) (92% - 100%)  ============================I/O===========================  I&O's Summary    2021 07:01  -  2021 07:00  --------------------------------------------------------  IN: 620 mL / OUT: 1000 mL / NET: -380 mL    2021 07:01  -  2021 17:08  --------------------------------------------------------  IN: 1111.2 mL / OUT: 1335 mL / NET: -223.8 mL      ============================ LABS =========================                        12.0   9.77  )-----------( 96        ( 2021 16:51 )             35.9     07-04    144  |  108  |  17  ----------------------------<  138<H>  4.1   |  19<L>  |  0.99    Ca    9.5      2021 13:42  Phos  3.3     07-04  Mg     2.6     07-04    TPro  5.9<L>  /  Alb  4.0  /  TBili  1.1  /  DBili  x   /  AST  16  /  ALT  14  /  AlkPhos  52  07-04    LIVER FUNCTIONS - ( 2021 13:42 )  Alb: 4.0 g/dL / Pro: 5.9 g/dL / ALK PHOS: 52 U/L / ALT: 14 U/L / AST: 16 U/L / GGT: x           PT/INR - ( 2021 13:41 )   PT: 14.8 sec;   INR: 1.25 ratio         PTT - ( 2021 13:41 )  PTT:29.5 sec  ABG - ( 2021 15:05 )  pH, Arterial: 7.37  pH, Blood: x     /  pCO2: 39    /  pO2: 80    / HCO3: 22    / Base Excess: -2.0  /  SaO2: 95                Urinalysis Basic - ( 2021 05:26 )    Color: Yellow / Appearance: Clear / S.025 / pH: x  Gluc: x / Ketone: Negative  / Bili: Negative / Urobili: Negative   Blood: x / Protein: Trace / Nitrite: Negative   Leuk Esterase: Negative / RBC: x / WBC x   Sq Epi: x / Non Sq Epi: x / Bacteria: x      ======================Micro/Rad/Cardio=================  CXR: Reviewed  Echo: Reviewed  ======================================================  PAST MEDICAL & SURGICAL HISTORY:  HTN (hypertension), benign    Hypercholesterolemia    Neuroendocrine carcinoma of pancreas  treated surgically 12 years ago    Neuroendocrine carcinoma of pancreas  treated surgically    History of hemorrhoidectomy      ========================ASSESSMENT ================  Coronary Artery Disease Status Post Coronary Artery Bypass Graft Procedure with Transesophageal Echocardiogram on 2021  Hypertension   Hypercholesterolemia     Plan:  ====================== NEUROLOGY=====================  Addressed analgesic regimen to optimize function and sedated with an IV Dexmedetomidine infusion for ventilatory synchrony, now weaned to off.    ==================== RESPIRATORY======================  Respiratory status required full ventilatory support initially and subsequently patient was weaned to pressure support and extubated, close monitoring of respiratory rate and breathing pattern, the following of ABG’s with A-line monitoring, and continuous pulse oximetry monitoring.    ====================CARDIOVASCULAR==================  Patient with history of coronary artery disease, subsequently underwent coronary artery bypass grafting procedure earlier today. Volume resuscitation ordered for post operative hypovolemia related to spontaneous diuresis. Intraoperative rapid atrial fibrillation treated with IV amiodarone, now in sinus rhythm. Invasive hemodynamic monitoring with a central venous catheter & an A-line were required for the continuous central venous and MAP/BP monitoring to ensure adequate cardiovascular support.    ===================HEMATOLOGIC/ONC ===================  Monitor hemoglobin and hematocrit levels. Thromboctyopenia likely due to cardiopulmonary bypass.    ===================== RENAL =========================  Optimize renal perfusion with adequate volume resuscitation and continued monitoring of urine output, fluid balance, BUN/Creatinine.     ==================== GASTROINTESTINAL===================  NPO after recent procedure, advance diet as tolerated.     =======================    ENDOCRINE  =====================  Stress hyperglycemia required a continuous insulin infusion with hourly glucose monitoring.    ========================INFECTIOUS DISEASE================  Afebrile, White Blood Cells within normal limits. Continue Cefuroxime for perioperative antibiotic coverage.    cefuroxime  IVPB 1500 milliGRAM(s) IV Intermittent every 8 hours      Patient requires continuous monitoring with bedside rhythm monitoring, pulse oximetry monitoring, and continuous central venous and arterial pressure monitoring; and intermittent blood gas analysis.  Care plan discussed with ICU care team.    Patient remained critical, at risk for life threatening decompensation.   I have spent 35 minutes providing acute care with multiple re-evaluations throughout the evening.     By signing my name below, I, Ava Andres , attest that this documentation has been prepared under the direction and in the presence of Micheline White MD   Electronically signed: Ama Thomas, 21 @ 17:08    I, Micheline White, personally performed the services described in this documentation. All medical record entries made by the scribe were at my direction and in my presence. I have reviewed the chart and agree that the record reflects my personal performance and is accurate and complete  Electronically signed: Micheline White MD 21 @ 17:08

## 2021-07-05 DIAGNOSIS — I48.91 UNSPECIFIED ATRIAL FIBRILLATION: ICD-10-CM

## 2021-07-05 DIAGNOSIS — Z95.1 PRESENCE OF AORTOCORONARY BYPASS GRAFT: ICD-10-CM

## 2021-07-05 LAB
ALBUMIN SERPL ELPH-MCNC: 4.8 G/DL — SIGNIFICANT CHANGE UP (ref 3.3–5)
ALBUMIN SERPL ELPH-MCNC: 4.9 G/DL — SIGNIFICANT CHANGE UP (ref 3.3–5)
ALP SERPL-CCNC: 54 U/L — SIGNIFICANT CHANGE UP (ref 40–120)
ALP SERPL-CCNC: 55 U/L — SIGNIFICANT CHANGE UP (ref 40–120)
ALT FLD-CCNC: 20 U/L — SIGNIFICANT CHANGE UP (ref 10–45)
ALT FLD-CCNC: 24 U/L — SIGNIFICANT CHANGE UP (ref 10–45)
ANION GAP SERPL CALC-SCNC: 14 MMOL/L — SIGNIFICANT CHANGE UP (ref 5–17)
ANION GAP SERPL CALC-SCNC: 14 MMOL/L — SIGNIFICANT CHANGE UP (ref 5–17)
APTT BLD: 27.8 SEC — SIGNIFICANT CHANGE UP (ref 27.5–35.5)
AST SERPL-CCNC: 28 U/L — SIGNIFICANT CHANGE UP (ref 10–40)
AST SERPL-CCNC: 30 U/L — SIGNIFICANT CHANGE UP (ref 10–40)
BILIRUB SERPL-MCNC: 1.5 MG/DL — HIGH (ref 0.2–1.2)
BILIRUB SERPL-MCNC: 1.5 MG/DL — HIGH (ref 0.2–1.2)
BUN SERPL-MCNC: 14 MG/DL — SIGNIFICANT CHANGE UP (ref 7–23)
BUN SERPL-MCNC: 18 MG/DL — SIGNIFICANT CHANGE UP (ref 7–23)
CALCIUM SERPL-MCNC: 10 MG/DL — SIGNIFICANT CHANGE UP (ref 8.4–10.5)
CALCIUM SERPL-MCNC: 9.9 MG/DL — SIGNIFICANT CHANGE UP (ref 8.4–10.5)
CHLORIDE SERPL-SCNC: 104 MMOL/L — SIGNIFICANT CHANGE UP (ref 96–108)
CHLORIDE SERPL-SCNC: 107 MMOL/L — SIGNIFICANT CHANGE UP (ref 96–108)
CK MB BLD-MCNC: 3 % — SIGNIFICANT CHANGE UP (ref 0–3.5)
CK MB CFR SERPL CALC: 10.5 NG/ML — HIGH (ref 0–6.7)
CK SERPL-CCNC: 355 U/L — HIGH (ref 30–200)
CO2 SERPL-SCNC: 19 MMOL/L — LOW (ref 22–31)
CO2 SERPL-SCNC: 20 MMOL/L — LOW (ref 22–31)
CREAT SERPL-MCNC: 0.97 MG/DL — SIGNIFICANT CHANGE UP (ref 0.5–1.3)
CREAT SERPL-MCNC: 1.05 MG/DL — SIGNIFICANT CHANGE UP (ref 0.5–1.3)
GAS PNL BLDA: SIGNIFICANT CHANGE UP
GAS PNL BLDA: SIGNIFICANT CHANGE UP
GLUCOSE BLDC GLUCOMTR-MCNC: 119 MG/DL — HIGH (ref 70–99)
GLUCOSE BLDC GLUCOMTR-MCNC: 124 MG/DL — HIGH (ref 70–99)
GLUCOSE BLDC GLUCOMTR-MCNC: 126 MG/DL — HIGH (ref 70–99)
GLUCOSE BLDC GLUCOMTR-MCNC: 126 MG/DL — HIGH (ref 70–99)
GLUCOSE BLDC GLUCOMTR-MCNC: 129 MG/DL — HIGH (ref 70–99)
GLUCOSE BLDC GLUCOMTR-MCNC: 131 MG/DL — HIGH (ref 70–99)
GLUCOSE BLDC GLUCOMTR-MCNC: 96 MG/DL — SIGNIFICANT CHANGE UP (ref 70–99)
GLUCOSE SERPL-MCNC: 110 MG/DL — HIGH (ref 70–99)
GLUCOSE SERPL-MCNC: 136 MG/DL — HIGH (ref 70–99)
HCT VFR BLD CALC: 37.7 % — LOW (ref 39–50)
HCT VFR BLD CALC: 39.9 % — SIGNIFICANT CHANGE UP (ref 39–50)
HGB BLD-MCNC: 12.3 G/DL — LOW (ref 13–17)
HGB BLD-MCNC: 12.8 G/DL — LOW (ref 13–17)
INR BLD: 1.09 RATIO — SIGNIFICANT CHANGE UP (ref 0.88–1.16)
MAGNESIUM SERPL-MCNC: 2.4 MG/DL — SIGNIFICANT CHANGE UP (ref 1.6–2.6)
MCHC RBC-ENTMCNC: 29.1 PG — SIGNIFICANT CHANGE UP (ref 27–34)
MCHC RBC-ENTMCNC: 29.4 PG — SIGNIFICANT CHANGE UP (ref 27–34)
MCHC RBC-ENTMCNC: 32.1 GM/DL — SIGNIFICANT CHANGE UP (ref 32–36)
MCHC RBC-ENTMCNC: 32.6 GM/DL — SIGNIFICANT CHANGE UP (ref 32–36)
MCV RBC AUTO: 89.3 FL — SIGNIFICANT CHANGE UP (ref 80–100)
MCV RBC AUTO: 91.5 FL — SIGNIFICANT CHANGE UP (ref 80–100)
NRBC # BLD: 0 /100 WBCS — SIGNIFICANT CHANGE UP (ref 0–0)
NRBC # BLD: 0 /100 WBCS — SIGNIFICANT CHANGE UP (ref 0–0)
PHOSPHATE SERPL-MCNC: 2.4 MG/DL — LOW (ref 2.5–4.5)
PLATELET # BLD AUTO: 108 K/UL — LOW (ref 150–400)
PLATELET # BLD AUTO: 131 K/UL — LOW (ref 150–400)
POTASSIUM BLDA-SCNC: 4.1 MMOL/L — SIGNIFICANT CHANGE UP (ref 3.5–5.3)
POTASSIUM SERPL-MCNC: 4.2 MMOL/L — SIGNIFICANT CHANGE UP (ref 3.5–5.3)
POTASSIUM SERPL-MCNC: 4.5 MMOL/L — SIGNIFICANT CHANGE UP (ref 3.5–5.3)
POTASSIUM SERPL-SCNC: 4.2 MMOL/L — SIGNIFICANT CHANGE UP (ref 3.5–5.3)
POTASSIUM SERPL-SCNC: 4.5 MMOL/L — SIGNIFICANT CHANGE UP (ref 3.5–5.3)
PROT SERPL-MCNC: 6.7 G/DL — SIGNIFICANT CHANGE UP (ref 6–8.3)
PROT SERPL-MCNC: 7.1 G/DL — SIGNIFICANT CHANGE UP (ref 6–8.3)
PROTHROM AB SERPL-ACNC: 13 SEC — SIGNIFICANT CHANGE UP (ref 10.6–13.6)
RBC # BLD: 4.22 M/UL — SIGNIFICANT CHANGE UP (ref 4.2–5.8)
RBC # BLD: 4.36 M/UL — SIGNIFICANT CHANGE UP (ref 4.2–5.8)
RBC # FLD: 12.7 % — SIGNIFICANT CHANGE UP (ref 10.3–14.5)
RBC # FLD: 13 % — SIGNIFICANT CHANGE UP (ref 10.3–14.5)
SODIUM SERPL-SCNC: 138 MMOL/L — SIGNIFICANT CHANGE UP (ref 135–145)
SODIUM SERPL-SCNC: 140 MMOL/L — SIGNIFICANT CHANGE UP (ref 135–145)
TROPONIN T, HIGH SENSITIVITY RESULT: 166 NG/L — HIGH (ref 0–51)
WBC # BLD: 10.21 K/UL — SIGNIFICANT CHANGE UP (ref 3.8–10.5)
WBC # BLD: 16.72 K/UL — HIGH (ref 3.8–10.5)
WBC # FLD AUTO: 10.21 K/UL — SIGNIFICANT CHANGE UP (ref 3.8–10.5)
WBC # FLD AUTO: 16.72 K/UL — HIGH (ref 3.8–10.5)

## 2021-07-05 PROCEDURE — 71045 X-RAY EXAM CHEST 1 VIEW: CPT | Mod: 26

## 2021-07-05 PROCEDURE — 93010 ELECTROCARDIOGRAM REPORT: CPT

## 2021-07-05 PROCEDURE — 99291 CRITICAL CARE FIRST HOUR: CPT

## 2021-07-05 RX ORDER — ALBUMIN HUMAN 25 %
250 VIAL (ML) INTRAVENOUS ONCE
Refills: 0 | Status: COMPLETED | OUTPATIENT
Start: 2021-07-05 | End: 2021-07-04

## 2021-07-05 RX ORDER — ACETAMINOPHEN 500 MG
650 TABLET ORAL EVERY 6 HOURS
Refills: 0 | Status: DISCONTINUED | OUTPATIENT
Start: 2021-07-05 | End: 2021-07-07

## 2021-07-05 RX ORDER — OXYCODONE HYDROCHLORIDE 5 MG/1
5 TABLET ORAL EVERY 4 HOURS
Refills: 0 | Status: DISCONTINUED | OUTPATIENT
Start: 2021-07-05 | End: 2021-07-07

## 2021-07-05 RX ORDER — HYDROMORPHONE HYDROCHLORIDE 2 MG/ML
0.5 INJECTION INTRAMUSCULAR; INTRAVENOUS; SUBCUTANEOUS ONCE
Refills: 0 | Status: DISCONTINUED | OUTPATIENT
Start: 2021-07-05 | End: 2021-07-05

## 2021-07-05 RX ORDER — ENOXAPARIN SODIUM 100 MG/ML
40 INJECTION SUBCUTANEOUS DAILY
Refills: 0 | Status: DISCONTINUED | OUTPATIENT
Start: 2021-07-05 | End: 2021-07-07

## 2021-07-05 RX ORDER — ALBUMIN HUMAN 25 %
250 VIAL (ML) INTRAVENOUS ONCE
Refills: 0 | Status: COMPLETED | OUTPATIENT
Start: 2021-07-05 | End: 2021-07-05

## 2021-07-05 RX ORDER — METOPROLOL TARTRATE 50 MG
25 TABLET ORAL EVERY 12 HOURS
Refills: 0 | Status: DISCONTINUED | OUTPATIENT
Start: 2021-07-05 | End: 2021-07-06

## 2021-07-05 RX ORDER — ALPRAZOLAM 0.25 MG
0.25 TABLET ORAL ONCE
Refills: 0 | Status: DISCONTINUED | OUTPATIENT
Start: 2021-07-05 | End: 2021-07-05

## 2021-07-05 RX ORDER — POTASSIUM CHLORIDE 20 MEQ
10 PACKET (EA) ORAL
Refills: 0 | Status: COMPLETED | OUTPATIENT
Start: 2021-07-05 | End: 2021-07-05

## 2021-07-05 RX ORDER — ACETAMINOPHEN 500 MG
1000 TABLET ORAL ONCE
Refills: 0 | Status: COMPLETED | OUTPATIENT
Start: 2021-07-05 | End: 2021-07-05

## 2021-07-05 RX ORDER — OXYCODONE HYDROCHLORIDE 5 MG/1
10 TABLET ORAL EVERY 6 HOURS
Refills: 0 | Status: DISCONTINUED | OUTPATIENT
Start: 2021-07-05 | End: 2021-07-07

## 2021-07-05 RX ADMIN — Medication 125 MILLILITER(S): at 00:30

## 2021-07-05 RX ADMIN — Medication 400 MILLIGRAM(S): at 19:14

## 2021-07-05 RX ADMIN — OXYCODONE HYDROCHLORIDE 10 MILLIGRAM(S): 5 TABLET ORAL at 21:08

## 2021-07-05 RX ADMIN — ATORVASTATIN CALCIUM 40 MILLIGRAM(S): 80 TABLET, FILM COATED ORAL at 21:02

## 2021-07-05 RX ADMIN — Medication 100 MILLIEQUIVALENT(S): at 00:00

## 2021-07-05 RX ADMIN — Medication 100 MILLIEQUIVALENT(S): at 03:15

## 2021-07-05 RX ADMIN — OXYCODONE AND ACETAMINOPHEN 1 TABLET(S): 5; 325 TABLET ORAL at 17:06

## 2021-07-05 RX ADMIN — Medication 0.25 MILLIGRAM(S): at 00:52

## 2021-07-05 RX ADMIN — PANTOPRAZOLE SODIUM 40 MILLIGRAM(S): 20 TABLET, DELAYED RELEASE ORAL at 06:09

## 2021-07-05 RX ADMIN — Medication 100 MILLIGRAM(S): at 23:56

## 2021-07-05 RX ADMIN — HYDROMORPHONE HYDROCHLORIDE 0.5 MILLIGRAM(S): 2 INJECTION INTRAMUSCULAR; INTRAVENOUS; SUBCUTANEOUS at 10:23

## 2021-07-05 RX ADMIN — ENOXAPARIN SODIUM 40 MILLIGRAM(S): 100 INJECTION SUBCUTANEOUS at 11:40

## 2021-07-05 RX ADMIN — HYDROMORPHONE HYDROCHLORIDE 0.5 MILLIGRAM(S): 2 INJECTION INTRAMUSCULAR; INTRAVENOUS; SUBCUTANEOUS at 10:47

## 2021-07-05 RX ADMIN — HYDROMORPHONE HYDROCHLORIDE 0.5 MILLIGRAM(S): 2 INJECTION INTRAMUSCULAR; INTRAVENOUS; SUBCUTANEOUS at 00:00

## 2021-07-05 RX ADMIN — Medication 25 MILLIGRAM(S): at 04:43

## 2021-07-05 RX ADMIN — Medication 100 MILLIEQUIVALENT(S): at 02:46

## 2021-07-05 RX ADMIN — Medication 25 MILLIGRAM(S): at 17:00

## 2021-07-05 RX ADMIN — Medication 100 MILLIGRAM(S): at 17:00

## 2021-07-05 RX ADMIN — HYDROMORPHONE HYDROCHLORIDE 0.5 MILLIGRAM(S): 2 INJECTION INTRAMUSCULAR; INTRAVENOUS; SUBCUTANEOUS at 00:15

## 2021-07-05 RX ADMIN — Medication 1000 MILLIGRAM(S): at 19:50

## 2021-07-05 RX ADMIN — HYDROMORPHONE HYDROCHLORIDE 0.5 MILLIGRAM(S): 2 INJECTION INTRAMUSCULAR; INTRAVENOUS; SUBCUTANEOUS at 06:15

## 2021-07-05 RX ADMIN — HYDROMORPHONE HYDROCHLORIDE 0.5 MILLIGRAM(S): 2 INJECTION INTRAMUSCULAR; INTRAVENOUS; SUBCUTANEOUS at 06:09

## 2021-07-05 RX ADMIN — Medication 81 MILLIGRAM(S): at 11:40

## 2021-07-05 RX ADMIN — Medication 100 MILLIGRAM(S): at 07:49

## 2021-07-05 RX ADMIN — Medication 100 MILLIEQUIVALENT(S): at 04:15

## 2021-07-05 RX ADMIN — OXYCODONE AND ACETAMINOPHEN 1 TABLET(S): 5; 325 TABLET ORAL at 17:36

## 2021-07-05 RX ADMIN — OXYCODONE HYDROCHLORIDE 10 MILLIGRAM(S): 5 TABLET ORAL at 21:38

## 2021-07-05 NOTE — PROGRESS NOTE ADULT - PROBLEM SELECTOR PLAN 1
Episode of rapid Afib post op, was given IV amiodarone, now in sinus rhythm   Rate control with Lopressor   ASA and Statin for graft patency   maintain pleural tube/mediastinal tubes POD2  metoprolol tartrate 25 milliGRAM(s) Oral every 12 hours  aspirin enteric coated 81 milliGRAM(s) Oral daily  atorvastatin 40 milliGRAM(s) Oral at bedtime  VVI  40/8  Ambulate  Cough and deep breathe

## 2021-07-05 NOTE — PHYSICAL THERAPY INITIAL EVALUATION ADULT - PERTINENT HX OF CURRENT PROBLEM, REHAB EVAL
Pt is a  70 y.o. 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 Lateral wall is moderately hypokinetic, stress ECG is positive for ischemia. Transferred to [facility] for CABG evaluation. Pt now s/p C2L on 7/4.

## 2021-07-05 NOTE — PHYSICAL THERAPY INITIAL EVALUATION ADULT - ADDITIONAL COMMENTS
Pt lives in private home w/ wife and children. I with all ADLs, no devices, 5 SELENE and 6 steps within home. +runner &

## 2021-07-05 NOTE — PROGRESS NOTE ADULT - SUBJECTIVE AND OBJECTIVE BOX
JAH TORREZ  [id number]  Patient is a 70y old  Male who presents with a chief complaint of transfer for cabg eval (04 Jul 2021 17:55)    HPI:  70 y.o. male pmHX HTN, HLD, remote hx laparoscopic removal of neuroendocrine pancreatic tumor 14 years ago with 6 weeks of exertional chest pain and positive stress echo s/p LHC 7/1 showing ostial LAD 80%, mid LAD 80%, pLCX 75% and recommend surgical evaluation, stress Echo 6/28/21 - 64%. Lateral wall is moderately hypokinetic, stress ECG is positive for ischemia. Transferred to [facility] for CABG evaluation.      (02 Jul 2021 22:35)      Surgery/Hospital Course:  7/4 Coronary Artery Bypass Graft     Today:  No acute events     ICU Vital Signs Last 24 Hrs  T(C): 37.3 (05 Jul 2021 04:00), Max: 37.3 (05 Jul 2021 04:00)  T(F): 99.1 (05 Jul 2021 04:00), Max: 99.1 (05 Jul 2021 04:00)  HR: 69 (05 Jul 2021 06:15) (60 - 102)  BP: 128/81 (04 Jul 2021 07:16) (128/81 - 128/81)  BP(mean): 96 (04 Jul 2021 07:16) (96 - 96)  ABP: 127/61 (05 Jul 2021 06:15) (110/54 - 156/73)  ABP(mean): 80 (05 Jul 2021 06:15) (73 - 101)  RR: 24 (05 Jul 2021 06:15) (14 - 47)  SpO2: 94% (05 Jul 2021 06:15) (92% - 100%)      Physical Exam:  Gen:  Awake, alert   CNS: Sedated  Neck: no JVD  RES : clear , no wheezing  Chest tube: + Chest tube               CVS: Regular  rhythm. Normal S1/S2  Abd: Soft, non-distended. Bowel sounds present.  Skin: No rash.  Ext:  no edema    ============================I/O===========================   I&O's Detail    03 Jul 2021 07:01  -  04 Jul 2021 07:00  --------------------------------------------------------  IN:    Oral Fluid: 620 mL  Total IN: 620 mL    OUT:    Voided (mL): 1000 mL  Total OUT: 1000 mL    Total NET: -380 mL      04 Jul 2021 07:01  -  05 Jul 2021 06:44  --------------------------------------------------------  IN:    Albumin 5%  - 250 mL: 1250 mL    Dexmedetomidine: 61.2 mL    Insulin: 22 mL    IV PiggyBack: 850 mL    NiCARdipine: 252.5 mL    Oral Fluid: 200 mL    sodium chloride 0.9%: 180 mL  Total IN: 2815.7 mL    OUT:    Chest Tube (mL): 0 mL    Chest Tube (mL): 390 mL    Chest Tube (mL): 40 mL    Indwelling Catheter - Urethral (mL): 3290 mL  Total OUT: 3720 mL    Total NET: -904.3 mL        ============================ LABS =========================                        12.3   10.21 )-----------( 108      ( 05 Jul 2021 00:16 )             37.7     07-05    140  |  107  |  14  ----------------------------<  110<H>  4.2   |  19<L>  |  0.97    Ca    9.9      05 Jul 2021 00:23  Phos  2.4     07-05  Mg     2.4     07-05    TPro  6.7  /  Alb  4.9  /  TBili  1.5<H>  /  DBili  x   /  AST  28  /  ALT  20  /  AlkPhos  54  07-05    LIVER FUNCTIONS - ( 05 Jul 2021 00:23 )  Alb: 4.9 g/dL / Pro: 6.7 g/dL / ALK PHOS: 54 U/L / ALT: 20 U/L / AST: 28 U/L / GGT: x           PT/INR - ( 05 Jul 2021 00:17 )   PT: 13.0 sec;   INR: 1.09 ratio         PTT - ( 05 Jul 2021 00:17 )  PTT:27.8 sec  ABG - ( 05 Jul 2021 02:21 )  pH, Arterial: 7.43  pH, Blood: x     /  pCO2: 34    /  pO2: 94    / HCO3: 22    / Base Excess: -.9   /  SaO2: 98                  ======================Micro/Rad/Cardio=================  CXR: Reviewed  Echo:Reviewed  ======================================================  PAST MEDICAL & SURGICAL HISTORY:  HTN (hypertension), benign    Hypercholesterolemia    Neuroendocrine carcinoma of pancreas  treated surgically 12 years ago    Neuroendocrine carcinoma of pancreas  treated surgically    History of hemorrhoidectomy      ====================ASSESSMENT ==============  Coronary Artery Disease Status Post Coronary Artery Bypass Graft Procedure with Transesophageal Echocardiogram on 7/4/2021  Hypertension   Hypercholesterolemia   Hypovolemic shock  AFib  Thrombocytopenia   Post op respiratory insufficiency - resolved       Plan:  ====================== NEUROLOGY=====================  Sedated with IV Precedex  Continue to monitor neuro status per ICU protocol when PT is off sedation   Percocet PRN for  analgesia     dexMEDEtomidine Infusion 0.229 MICROgram(s)/kG/Hr (5 mL/Hr) IV Continuous <Continuous>  oxycodone    5 mG/acetaminophen 325 mG 1 Tablet(s) Oral every 4 hours PRN Mild Pain (1 - 3)  oxycodone    5 mG/acetaminophen 325 mG 2 Tablet(s) Oral every 6 hours PRN Moderate Pain (4 - 6)    ==================== RESPIRATORY======================  Extubated yesterday, now on supplemental O2 via NC  Encourage incentive spirometry, continue pulse ox monitoring, follow ABGs     Mechanical Ventilation:  Mode: CPAP with PS  FiO2: 50  PEEP: 5  PS: 5  MAP: 7  PIP: 10      ====================CARDIOVASCULAR==================  CAD S/P C2 BIMA   Episode of rapid Afib post op, was given IV amiodarone, now in sinus rhythm   Invasive hemodynamic monitoring, assess perfusion indices.   Rate control with Lopressor   Nicardipine for hypertension   ASA and Statin for graft patency     metoprolol tartrate 25 milliGRAM(s) Oral every 12 hours  niCARdipine Infusion 5 mG/Hr (25 mL/Hr) IV Continuous <Continuous>  aspirin enteric coated 81 milliGRAM(s) Oral daily  atorvastatin 40 milliGRAM(s) Oral at bedtime  ===================HEMATOLOGIC/ONC ===================  Thrombocytopenia, Monitor H&H/Plts      ===================== RENAL =========================  Continue to monitor I/Os, BUN/Creatinine, and urine output.   Goal net negative fluid balance. Replete lytes PRN. Keep K> 4 and Mg >2.     ==================== GASTROINTESTINAL===================  Currently NPO, will advance diet as tolerated   Continue Protonix for stress ulcer prophylaxis.     pantoprazole    Tablet 40 milliGRAM(s) Oral before breakfast  potassium chloride  10 mEq/50 mL IVPB 10 milliEquivalent(s) IV Intermittent every 1 hour  potassium chloride  10 mEq/50 mL IVPB 10 milliEquivalent(s) IV Intermittent every 1 hour  potassium chloride  10 mEq/50 mL IVPB 10 milliEquivalent(s) IV Intermittent every 1 hour  sodium chloride 0.9%. 1000 milliLiter(s) (10 mL/Hr) IV Continuous <Continuous>    =======================    ENDOCRINE  =====================  Stress hyperglycemia, continue glycemic control with IV Insulin     dextrose 50% Injectable 50 milliLiter(s) IV Push every 15 minutes  dextrose 50% Injectable 50 milliLiter(s) IV Push every 15 minutes  dextrose 50% Injectable 25 milliLiter(s) IV Push every 15 minutes  insulin regular Infusion 5 Unit(s)/Hr (5 mL/Hr) IV Continuous <Continuous>    ========================INFECTIOUS DISEASE================  Temperature 99.1 with WBC within normal range   Continue to monitor temperature and WBC   Continue Cefuroxime for perioperative antibiotic coverage,     cefuroxime  IVPB 1500 milliGRAM(s) IV Intermittent every 8 hours          I have spent 35 minutes providing acute care for this critically ill patient     Patient requires continuous monitoring with bedside rhythm monitoring, pulse ox monitoring, and intermittent blood gas analysis. Care plan discussed with ICU care team. Patient remained critical and at risk for life threatening decompensation.     By signing my name below, I, Hernan Gomez, attest that this documentation has been prepared under the direction and in the presence of Dell Henry MD   Electronically signed: Ama Olivier, 07-05-21 @ 06:44    I, Dell Henry, personally performed the services described in this documentation. all medical record entries made by the scribe were at my direction and in my presence. I have reviewed the chart and agree that the record reflects my personal performance and is accurate and complete  Electronically signed: Dell Henry MD        JAH TORREZ  [id number]  Patient is a 70y old  Male who presents with a chief complaint of transfer for cabg eval (04 Jul 2021 17:55)    HPI:  70 y.o. male pmHX HTN, HLD, remote hx laparoscopic removal of neuroendocrine pancreatic tumor 14 years ago with 6 weeks of exertional chest pain and positive stress echo s/p LHC 7/1 showing ostial LAD 80%, mid LAD 80%, pLCX 75% and recommend surgical evaluation, stress Echo 6/28/21 - 64%. Lateral wall is moderately hypokinetic, stress ECG is positive for ischemia. Transferred to [facility] for CABG evaluation.      (02 Jul 2021 22:35)      Surgery/Hospital Course:  7/4 Coronary Artery Bypass Graft     Today: Continue OOBTC. Insulin for stress hyperglycemia. Post op respiratory insufficiency requiring supplemental O2. Patient is a candidate for step down.         ICU Vital Signs Last 24 Hrs  T(C): 37.3 (05 Jul 2021 04:00), Max: 37.3 (05 Jul 2021 04:00)  T(F): 99.1 (05 Jul 2021 04:00), Max: 99.1 (05 Jul 2021 04:00)  HR: 69 (05 Jul 2021 06:15) (60 - 102)  BP: 128/81 (04 Jul 2021 07:16) (128/81 - 128/81)  BP(mean): 96 (04 Jul 2021 07:16) (96 - 96)  ABP: 127/61 (05 Jul 2021 06:15) (110/54 - 156/73)  ABP(mean): 80 (05 Jul 2021 06:15) (73 - 101)  RR: 24 (05 Jul 2021 06:15) (14 - 47)  SpO2: 94% (05 Jul 2021 06:15) (92% - 100%)      Physical Exam:  Gen:  Awake, alert   CNS: Sedated  Neck: no JVD  RES : clear , no wheezing  Chest tube: + Chest tube               CVS: Regular  rhythm. Normal S1/S2  Abd: Soft, non-distended. Bowel sounds present.  Skin: No rash.  Ext:  no edema    ============================I/O===========================   I&O's Detail    03 Jul 2021 07:01  -  04 Jul 2021 07:00  --------------------------------------------------------  IN:    Oral Fluid: 620 mL  Total IN: 620 mL    OUT:    Voided (mL): 1000 mL  Total OUT: 1000 mL    Total NET: -380 mL      04 Jul 2021 07:01  -  05 Jul 2021 06:44  --------------------------------------------------------  IN:    Albumin 5%  - 250 mL: 1250 mL    Dexmedetomidine: 61.2 mL    Insulin: 22 mL    IV PiggyBack: 850 mL    NiCARdipine: 252.5 mL    Oral Fluid: 200 mL    sodium chloride 0.9%: 180 mL  Total IN: 2815.7 mL    OUT:    Chest Tube (mL): 0 mL    Chest Tube (mL): 390 mL    Chest Tube (mL): 40 mL    Indwelling Catheter - Urethral (mL): 3290 mL  Total OUT: 3720 mL    Total NET: -904.3 mL        ============================ LABS =========================                        12.3   10.21 )-----------( 108      ( 05 Jul 2021 00:16 )             37.7     07-05    140  |  107  |  14  ----------------------------<  110<H>  4.2   |  19<L>  |  0.97    Ca    9.9      05 Jul 2021 00:23  Phos  2.4     07-05  Mg     2.4     07-05    TPro  6.7  /  Alb  4.9  /  TBili  1.5<H>  /  DBili  x   /  AST  28  /  ALT  20  /  AlkPhos  54  07-05    LIVER FUNCTIONS - ( 05 Jul 2021 00:23 )  Alb: 4.9 g/dL / Pro: 6.7 g/dL / ALK PHOS: 54 U/L / ALT: 20 U/L / AST: 28 U/L / GGT: x           PT/INR - ( 05 Jul 2021 00:17 )   PT: 13.0 sec;   INR: 1.09 ratio         PTT - ( 05 Jul 2021 00:17 )  PTT:27.8 sec  ABG - ( 05 Jul 2021 02:21 )  pH, Arterial: 7.43  pH, Blood: x     /  pCO2: 34    /  pO2: 94    / HCO3: 22    / Base Excess: -.9   /  SaO2: 98                  ======================Micro/Rad/Cardio=================  CXR: Reviewed  Echo:Reviewed  ======================================================  PAST MEDICAL & SURGICAL HISTORY:  HTN (hypertension), benign    Hypercholesterolemia    Neuroendocrine carcinoma of pancreas  treated surgically 12 years ago    Neuroendocrine carcinoma of pancreas  treated surgically    History of hemorrhoidectomy      ====================ASSESSMENT ==============  Coronary Artery Disease Status Post Coronary Artery Bypass Graft Procedure with Transesophageal Echocardiogram on 7/4/2021  Hypertension   Hypercholesterolemia   Hypovolemic shock  AFib  Thrombocytopenia   Post op respiratory insufficiency - resolved       Plan:  ====================== NEUROLOGY=====================  Sedated with IV Precedex  Continue to monitor neuro status per ICU protocol when PT is off sedation   Percocet PRN for  analgesia     dexMEDEtomidine Infusion 0.229 MICROgram(s)/kG/Hr (5 mL/Hr) IV Continuous <Continuous>  oxycodone    5 mG/acetaminophen 325 mG 1 Tablet(s) Oral every 4 hours PRN Mild Pain (1 - 3)  oxycodone    5 mG/acetaminophen 325 mG 2 Tablet(s) Oral every 6 hours PRN Moderate Pain (4 - 6)    ==================== RESPIRATORY======================  Extubated yesterday, now on supplemental O2 via NC  Encourage incentive spirometry, continue pulse ox monitoring, follow ABGs     Mechanical Ventilation:  Mode: CPAP with PS  FiO2: 50  PEEP: 5  PS: 5  MAP: 7  PIP: 10      ====================CARDIOVASCULAR==================  CAD S/P C2 BIMA   Episode of rapid Afib post op, was given IV amiodarone, now in sinus rhythm   Invasive hemodynamic monitoring, assess perfusion indices.   Rate control with Lopressor   Nicardipine for hypertension   ASA and Statin for graft patency     metoprolol tartrate 25 milliGRAM(s) Oral every 12 hours  niCARdipine Infusion 5 mG/Hr (25 mL/Hr) IV Continuous <Continuous>  aspirin enteric coated 81 milliGRAM(s) Oral daily  atorvastatin 40 milliGRAM(s) Oral at bedtime  ===================HEMATOLOGIC/ONC ===================  Thrombocytopenia, Monitor H&H/Plts      ===================== RENAL =========================  Continue to monitor I/Os, BUN/Creatinine, and urine output.   Goal net negative fluid balance. Replete lytes PRN. Keep K> 4 and Mg >2.     ==================== GASTROINTESTINAL===================  Currently NPO, will advance diet as tolerated   Continue Protonix for stress ulcer prophylaxis.     pantoprazole    Tablet 40 milliGRAM(s) Oral before breakfast  potassium chloride  10 mEq/50 mL IVPB 10 milliEquivalent(s) IV Intermittent every 1 hour  potassium chloride  10 mEq/50 mL IVPB 10 milliEquivalent(s) IV Intermittent every 1 hour  potassium chloride  10 mEq/50 mL IVPB 10 milliEquivalent(s) IV Intermittent every 1 hour  sodium chloride 0.9%. 1000 milliLiter(s) (10 mL/Hr) IV Continuous <Continuous>    =======================    ENDOCRINE  =====================  Stress hyperglycemia, continue glycemic control with IV Insulin     dextrose 50% Injectable 50 milliLiter(s) IV Push every 15 minutes  dextrose 50% Injectable 50 milliLiter(s) IV Push every 15 minutes  dextrose 50% Injectable 25 milliLiter(s) IV Push every 15 minutes  insulin regular Infusion 5 Unit(s)/Hr (5 mL/Hr) IV Continuous <Continuous>    ========================INFECTIOUS DISEASE================  Temperature 99.1 with WBC within normal range   Continue to monitor temperature and WBC   Continue Cefuroxime for perioperative antibiotic coverage,     cefuroxime  IVPB 1500 milliGRAM(s) IV Intermittent every 8 hours          I have spent 35 minutes providing acute care for this critically ill patient     Patient requires continuous monitoring with bedside rhythm monitoring, pulse ox monitoring, and intermittent blood gas analysis. Care plan discussed with ICU care team. Patient remained critical and at risk for life threatening decompensation.     By signing my name below, I, Hernan Gomez, attest that this documentation has been prepared under the direction and in the presence of Dell Henry MD   Electronically signed: Olivia Olivier, 07-05-21 @ 06:44    I, Dell Henry, personally performed the services described in this documentation. all medical record entries made by the olivia were at my direction and in my presence. I have reviewed the chart and agree that the record reflects my personal performance and is accurate and complete  Electronically signed: Dell Henry MD

## 2021-07-05 NOTE — PROGRESS NOTE ADULT - SUBJECTIVE AND OBJECTIVE BOX
Subjective: "Feel good, no pain right now, alot of tubes to lug around"  OOB chair Transferred from CTU this afternoon      Tele:  SR  80s           V/S:                    T(F): 98.1 (21 @ 10:52), Max: 99.7 (21 @ 07:45)  HR: 78 (21 @ 10:52) (63 - 102)  BP: 136/70 (21 @ 10:52) (114/71 - 136/70)  RR: 18 (21 @ 10:52) (14 - 47)  SpO2: 97% (21 @ 10:52) (92% - 100%)  Wt(kg): --      LV EF:      Labs:      140  |  107  |  14  ----------------------------<  110<H>  4.2   |  19<L>  |  0.97    Ca    9.9      2021 00:23  Phos  2.4     07-  Mg     2.4     07-05    TPro  6.7  /  Alb  4.9  /  TBili  1.5<H>  /  DBili  x   /  AST  28  /  ALT  20  /  AlkPhos  54  07-05                               12.3   10.21 )-----------( 108      ( 2021 00:16 )             37.7        PT/INR - ( 2021 00:17 )   PT: 13.0 sec;   INR: 1.09 ratio         PTT - ( 2021 00:17 )  PTT:27.8 sec     CAPILLARY BLOOD GLUCOSE  119 (2021 07:00)  126 (2021 06:00)  124 (2021 05:00)  126 (2021 04:00)  131 (2021 03:00)  134 (2021 02:00)  96 (2021 01:00)  108 (2021 00:00)  107 (2021 23:00)  119 (2021 22:00)  108 (2021 21:00)  122 (2021 20:00)  136 (2021 19:00)      POCT Blood Glucose.: 129 mg/dL (2021 07:25)  POCT Blood Glucose.: 119 mg/dL (2021 06:56)  POCT Blood Glucose.: 126 mg/dL (2021 06:07)  POCT Blood Glucose.: 124 mg/dL (2021 05:04)  POCT Blood Glucose.: 126 mg/dL (2021 04:00)  POCT Blood Glucose.: 131 mg/dL (2021 03:15)  POCT Blood Glucose.: 96 mg/dL (2021 00:49)  POCT Blood Glucose.: 107 mg/dL (2021 22:58)  POCT Blood Glucose.: 108 mg/dL (2021 21:00)  POCT Blood Glucose.: 122 mg/dL (2021 20:02)  POCT Blood Glucose.: 136 mg/dL (2021 18:50)  POCT Blood Glucose.: 156 mg/dL (2021 17:47)  POCT Blood Glucose.: 156 mg/dL (2021 16:50)  POCT Blood Glucose.: 149 mg/dL (2021 15:50)  POCT Blood Glucose.: 148 mg/dL (2021 15:06)  POCT Blood Glucose.: 134 mg/dL (2021 14:10)           CXR:    I&O's Detail    2021 07:01  -  2021 07:00  --------------------------------------------------------  IN:    Albumin 5%  - 250 mL: 1250 mL    Dexmedetomidine: 61.2 mL    Insulin: 22 mL    IV PiggyBack: 850 mL    NiCARdipine: 252.5 mL    Oral Fluid: 200 mL    sodium chloride 0.9%: 190 mL  Total IN: 2825.7 mL    OUT:    Chest Tube (mL): 40 mL    Chest Tube (mL): 0 mL    Chest Tube (mL): 390 mL    Indwelling Catheter - Urethral (mL): 3550 mL  Total OUT: 3980 mL    Total NET: -1154.3 mL      2021 07:01  -  2021 11:55  --------------------------------------------------------  IN:    IV PiggyBack: 50 mL    Oral Fluid: 240 mL    sodium chloride 0.9%: 10 mL  Total IN: 300 mL    OUT:    Chest Tube (mL): 20 mL    Chest Tube (mL): 0 mL    Chest Tube (mL): 0 mL    Indwelling Catheter - Urethral (mL): 100 mL  Total OUT: 120 mL    Total NET: 180 mL          CHEST TUBE:  [x ] YES [ ] NO  OUTPUT:     per 24 hours    AIR LEAKS:  [ ] YES [ x] NO      CLARISSA DRAIN:   [x ] YES [ ] NO  OUTPUT:     per 24 hours    EPICARDIAL WIRES:  [x ] YES [ ] NO  VVI 40/8    BOWEL MOVEMENT:  [ ] YES [x ] NO      Daily     Daily Weight in k.1 (2021 00:00)  Medications:  aspirin enteric coated 81 milliGRAM(s) Oral daily  atorvastatin 40 milliGRAM(s) Oral at bedtime  cefuroxime  IVPB 1500 milliGRAM(s) IV Intermittent every 8 hours  chlorhexidine 0.12% Liquid 5 milliLiter(s) Oral Mucosa two times a day  dextrose 50% Injectable 50 milliLiter(s) IV Push every 15 minutes  dextrose 50% Injectable 50 milliLiter(s) IV Push every 15 minutes  dextrose 50% Injectable 25 milliLiter(s) IV Push every 15 minutes  enoxaparin Injectable 40 milliGRAM(s) SubCutaneous daily  metoprolol tartrate 25 milliGRAM(s) Oral every 12 hours  oxycodone    5 mG/acetaminophen 325 mG 1 Tablet(s) Oral every 4 hours PRN  oxycodone    5 mG/acetaminophen 325 mG 2 Tablet(s) Oral every 6 hours PRN  pantoprazole    Tablet 40 milliGRAM(s) Oral before breakfast  sodium chloride 0.9%. 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

## 2021-07-05 NOTE — PROGRESS NOTE ADULT - ASSESSMENT
70 y.o. male pmHX HTN, HLD, remote hx laparoscopic removal of neuroendocrine pancreatic tumor 14 years ago with 6 weeks of exertional chest pain  7/4 Coronary Artery Bypass Graft BIMA x 2  postop hyperglycemia  7/5 SDU

## 2021-07-06 ENCOUNTER — TRANSCRIPTION ENCOUNTER (OUTPATIENT)
Age: 71
End: 2021-07-06

## 2021-07-06 LAB
ANION GAP SERPL CALC-SCNC: 13 MMOL/L — SIGNIFICANT CHANGE UP (ref 5–17)
BUN SERPL-MCNC: 26 MG/DL — HIGH (ref 7–23)
CALCIUM SERPL-MCNC: 9.9 MG/DL — SIGNIFICANT CHANGE UP (ref 8.4–10.5)
CHLORIDE SERPL-SCNC: 103 MMOL/L — SIGNIFICANT CHANGE UP (ref 96–108)
CO2 SERPL-SCNC: 22 MMOL/L — SIGNIFICANT CHANGE UP (ref 22–31)
CREAT SERPL-MCNC: 1.14 MG/DL — SIGNIFICANT CHANGE UP (ref 0.5–1.3)
GLUCOSE SERPL-MCNC: 108 MG/DL — HIGH (ref 70–99)
HCT VFR BLD CALC: 37.7 % — LOW (ref 39–50)
HGB BLD-MCNC: 12 G/DL — LOW (ref 13–17)
MCHC RBC-ENTMCNC: 29.6 PG — SIGNIFICANT CHANGE UP (ref 27–34)
MCHC RBC-ENTMCNC: 31.8 GM/DL — LOW (ref 32–36)
MCV RBC AUTO: 93.1 FL — SIGNIFICANT CHANGE UP (ref 80–100)
NRBC # BLD: 0 /100 WBCS — SIGNIFICANT CHANGE UP (ref 0–0)
PLATELET # BLD AUTO: 113 K/UL — LOW (ref 150–400)
POTASSIUM SERPL-MCNC: 4.9 MMOL/L — SIGNIFICANT CHANGE UP (ref 3.5–5.3)
POTASSIUM SERPL-SCNC: 4.9 MMOL/L — SIGNIFICANT CHANGE UP (ref 3.5–5.3)
RBC # BLD: 4.05 M/UL — LOW (ref 4.2–5.8)
RBC # FLD: 13.2 % — SIGNIFICANT CHANGE UP (ref 10.3–14.5)
SODIUM SERPL-SCNC: 138 MMOL/L — SIGNIFICANT CHANGE UP (ref 135–145)
WBC # BLD: 15.21 K/UL — HIGH (ref 3.8–10.5)
WBC # FLD AUTO: 15.21 K/UL — HIGH (ref 3.8–10.5)

## 2021-07-06 PROCEDURE — 71045 X-RAY EXAM CHEST 1 VIEW: CPT | Mod: 26,77

## 2021-07-06 PROCEDURE — 71045 X-RAY EXAM CHEST 1 VIEW: CPT | Mod: 26

## 2021-07-06 RX ORDER — METOPROLOL TARTRATE 50 MG
50 TABLET ORAL DAILY
Refills: 0 | Status: DISCONTINUED | OUTPATIENT
Start: 2021-07-06 | End: 2021-07-07

## 2021-07-06 RX ADMIN — Medication 650 MILLIGRAM(S): at 22:04

## 2021-07-06 RX ADMIN — Medication 25 MILLIGRAM(S): at 05:38

## 2021-07-06 RX ADMIN — Medication 81 MILLIGRAM(S): at 11:19

## 2021-07-06 RX ADMIN — Medication 50 MILLIGRAM(S): at 11:19

## 2021-07-06 RX ADMIN — PANTOPRAZOLE SODIUM 40 MILLIGRAM(S): 20 TABLET, DELAYED RELEASE ORAL at 05:38

## 2021-07-06 RX ADMIN — OXYCODONE HYDROCHLORIDE 10 MILLIGRAM(S): 5 TABLET ORAL at 03:17

## 2021-07-06 RX ADMIN — OXYCODONE HYDROCHLORIDE 10 MILLIGRAM(S): 5 TABLET ORAL at 03:47

## 2021-07-06 RX ADMIN — ENOXAPARIN SODIUM 40 MILLIGRAM(S): 100 INJECTION SUBCUTANEOUS at 11:20

## 2021-07-06 RX ADMIN — Medication 650 MILLIGRAM(S): at 23:00

## 2021-07-06 RX ADMIN — ATORVASTATIN CALCIUM 40 MILLIGRAM(S): 80 TABLET, FILM COATED ORAL at 21:05

## 2021-07-06 NOTE — PROGRESS NOTE ADULT - REASON FOR ADMISSION
transfer for cabg eval
7/4 [id number]

## 2021-07-06 NOTE — PROGRESS NOTE ADULT - ASSESSMENT
70 y.o. male pmHX HTN, HLD, remote hx laparoscopic removal of neuroendocrine pancreatic tumor 14 years ago with 6 weeks of exertional chest pain  7/4 Coronary Artery Bypass Graft BIMA x 2  postop hyperglycemia  7/5 SDU  7/6  d/c chest tubes, PW, change to toprol xl, transfer to floor

## 2021-07-06 NOTE — PROGRESS NOTE ADULT - SUBJECTIVE AND OBJECTIVE BOX
im ok    VITAL SIGNS    Telemetry:  nsr  50-70    Vital Signs Last 24 Hrs  T(C): 36.7 (21 @ 07:20), Max: 36.8 (21 @ 23:29)  T(F): 98.1 (21 @ 07:20), Max: 98.2 (21 @ 23:29)  HR: 76 (21 @ 07:20) (74 - 91)  BP: 142/92 (21 @ 07:20) (125/71 - 142/92)  RR: 18 (21 @ 07:20) (18 - 19)  SpO2: 95% (21 @ 07:20) (94% - 97%)                    @ 07:01  -   @ 07:00  --------------------------------------------------------  IN: 910 mL / OUT: 1178 mL / NET: -268 mL     @ 07:01  -   @ 09:53  --------------------------------------------------------  IN: 300 mL / OUT: 30 mL / NET: 270 mL          Daily     Daily Weight in k.3 (2021 08:52)            CAPILLARY BLOOD GLUCOSE                Drains:     MS         [ x ] Drainage:                 L Pleural  [ x ]  Drainage:                R Pleural  [x  ]  Drainage:    Pacing Wires        [ x ]   Settings:                                  Isolated  [  ]    Coumadin    [ ] YES          [x  ]      NO                                   PHYSICAL EXAM        Neurology: alert and oriented x 3, nonfocal, no gross deficits  CV : s1 s2 RRR  Sternal Wound :  CDI , Stable  Lungs: cta  Abdomen: soft, nontender, nondistended, positive bowel sounds, last bowel                       chest tubes x 3  :    voiding     Extremities:     -katie   /  -   calve tenderness ,            acetaminophen   Tablet .. 650 milliGRAM(s) Oral every 6 hours PRN  aspirin enteric coated 81 milliGRAM(s) Oral daily  atorvastatin 40 milliGRAM(s) Oral at bedtime  chlorhexidine 0.12% Liquid 5 milliLiter(s) Oral Mucosa two times a day  dextrose 50% Injectable 50 milliLiter(s) IV Push every 15 minutes  dextrose 50% Injectable 50 milliLiter(s) IV Push every 15 minutes  dextrose 50% Injectable 25 milliLiter(s) IV Push every 15 minutes  enoxaparin Injectable 40 milliGRAM(s) SubCutaneous daily  metoprolol succinate ER 50 milliGRAM(s) Oral daily  oxyCODONE    IR 5 milliGRAM(s) Oral every 4 hours PRN  oxyCODONE    IR 10 milliGRAM(s) Oral every 6 hours PRN  pantoprazole    Tablet 40 milliGRAM(s) Oral before breakfast  sodium chloride 0.9%. 1000 milliLiter(s) IV Continuous <Continuous>                    Physical Therapy Rec:   Home  [  ]   Home w/ PT  [  ]  Rehab  [  ]  Discussed with Cardiothoracic Team at AM rounds.

## 2021-07-06 NOTE — DISCHARGE NOTE NURSING/CASE MANAGEMENT/SOCIAL WORK - PATIENT PORTAL LINK FT
You can access the FollowMyHealth Patient Portal offered by [facility] by registering at the following website: [email or web address]. By joining [email or web address] FollowMyHealth portal, you will also be able to view your health information using other applications (apps) compatible with our system.

## 2021-07-07 ENCOUNTER — TRANSCRIPTION ENCOUNTER (OUTPATIENT)
Age: 71
End: 2021-07-07

## 2021-07-07 VITALS — HEART RATE: 75 BPM | WEIGHT: 191.36 LBS | DIASTOLIC BLOOD PRESSURE: 93 MMHG | SYSTOLIC BLOOD PRESSURE: 134 MMHG

## 2021-07-07 LAB
ANION GAP SERPL CALC-SCNC: 12 MMOL/L — SIGNIFICANT CHANGE UP (ref 5–17)
BUN SERPL-MCNC: 22 MG/DL — SIGNIFICANT CHANGE UP (ref 7–23)
CALCIUM SERPL-MCNC: 9.5 MG/DL — SIGNIFICANT CHANGE UP (ref 8.4–10.5)
CHLORIDE SERPL-SCNC: 103 MMOL/L — SIGNIFICANT CHANGE UP (ref 96–108)
CO2 SERPL-SCNC: 23 MMOL/L — SIGNIFICANT CHANGE UP (ref 22–31)
CREAT SERPL-MCNC: 0.94 MG/DL — SIGNIFICANT CHANGE UP (ref 0.5–1.3)
GLUCOSE SERPL-MCNC: 99 MG/DL — SIGNIFICANT CHANGE UP (ref 70–99)
HCT VFR BLD CALC: 41.3 % — SIGNIFICANT CHANGE UP (ref 39–50)
HGB BLD-MCNC: 13.5 G/DL — SIGNIFICANT CHANGE UP (ref 13–17)
MCHC RBC-ENTMCNC: 29.7 PG — SIGNIFICANT CHANGE UP (ref 27–34)
MCHC RBC-ENTMCNC: 32.7 GM/DL — SIGNIFICANT CHANGE UP (ref 32–36)
MCV RBC AUTO: 91 FL — SIGNIFICANT CHANGE UP (ref 80–100)
NRBC # BLD: 0 /100 WBCS — SIGNIFICANT CHANGE UP (ref 0–0)
PLATELET # BLD AUTO: 111 K/UL — LOW (ref 150–400)
POTASSIUM SERPL-MCNC: 3.9 MMOL/L — SIGNIFICANT CHANGE UP (ref 3.5–5.3)
POTASSIUM SERPL-SCNC: 3.9 MMOL/L — SIGNIFICANT CHANGE UP (ref 3.5–5.3)
RBC # BLD: 4.54 M/UL — SIGNIFICANT CHANGE UP (ref 4.2–5.8)
RBC # FLD: 12.9 % — SIGNIFICANT CHANGE UP (ref 10.3–14.5)
SODIUM SERPL-SCNC: 138 MMOL/L — SIGNIFICANT CHANGE UP (ref 135–145)
WBC # BLD: 8.49 K/UL — SIGNIFICANT CHANGE UP (ref 3.8–10.5)
WBC # FLD AUTO: 8.49 K/UL — SIGNIFICANT CHANGE UP (ref 3.8–10.5)

## 2021-07-07 PROCEDURE — P9045: CPT

## 2021-07-07 PROCEDURE — 97162 PT EVAL MOD COMPLEX 30 MIN: CPT

## 2021-07-07 PROCEDURE — 83735 ASSAY OF MAGNESIUM: CPT

## 2021-07-07 PROCEDURE — P9041: CPT

## 2021-07-07 PROCEDURE — 85025 COMPLETE CBC W/AUTO DIFF WBC: CPT

## 2021-07-07 PROCEDURE — 93005 ELECTROCARDIOGRAM TRACING: CPT

## 2021-07-07 PROCEDURE — 85396 CLOTTING ASSAY WHOLE BLOOD: CPT

## 2021-07-07 PROCEDURE — 71045 X-RAY EXAM CHEST 1 VIEW: CPT | Mod: 26

## 2021-07-07 PROCEDURE — 82550 ASSAY OF CK (CPK): CPT

## 2021-07-07 PROCEDURE — 80053 COMPREHEN METABOLIC PANEL: CPT

## 2021-07-07 PROCEDURE — 97116 GAIT TRAINING THERAPY: CPT

## 2021-07-07 PROCEDURE — 86900 BLOOD TYPING SEROLOGIC ABO: CPT

## 2021-07-07 PROCEDURE — 85018 HEMOGLOBIN: CPT

## 2021-07-07 PROCEDURE — 84443 ASSAY THYROID STIM HORMONE: CPT

## 2021-07-07 PROCEDURE — 81003 URINALYSIS AUTO W/O SCOPE: CPT

## 2021-07-07 PROCEDURE — 84134 ASSAY OF PREALBUMIN: CPT

## 2021-07-07 PROCEDURE — 82435 ASSAY OF BLOOD CHLORIDE: CPT

## 2021-07-07 PROCEDURE — P9047: CPT

## 2021-07-07 PROCEDURE — 86923 COMPATIBILITY TEST ELECTRIC: CPT

## 2021-07-07 PROCEDURE — 85610 PROTHROMBIN TIME: CPT

## 2021-07-07 PROCEDURE — 84484 ASSAY OF TROPONIN QUANT: CPT

## 2021-07-07 PROCEDURE — C1889: CPT

## 2021-07-07 PROCEDURE — 85576 BLOOD PLATELET AGGREGATION: CPT

## 2021-07-07 PROCEDURE — 82947 ASSAY GLUCOSE BLOOD QUANT: CPT

## 2021-07-07 PROCEDURE — 83605 ASSAY OF LACTIC ACID: CPT

## 2021-07-07 PROCEDURE — U0005: CPT

## 2021-07-07 PROCEDURE — 71045 X-RAY EXAM CHEST 1 VIEW: CPT

## 2021-07-07 PROCEDURE — 82330 ASSAY OF CALCIUM: CPT

## 2021-07-07 PROCEDURE — 86850 RBC ANTIBODY SCREEN: CPT

## 2021-07-07 PROCEDURE — U0003: CPT

## 2021-07-07 PROCEDURE — 83036 HEMOGLOBIN GLYCOSYLATED A1C: CPT

## 2021-07-07 PROCEDURE — 97530 THERAPEUTIC ACTIVITIES: CPT

## 2021-07-07 PROCEDURE — 84100 ASSAY OF PHOSPHORUS: CPT

## 2021-07-07 PROCEDURE — 80048 BASIC METABOLIC PNL TOTAL CA: CPT

## 2021-07-07 PROCEDURE — 84295 ASSAY OF SERUM SODIUM: CPT

## 2021-07-07 PROCEDURE — 83880 ASSAY OF NATRIURETIC PEPTIDE: CPT

## 2021-07-07 PROCEDURE — 86803 HEPATITIS C AB TEST: CPT

## 2021-07-07 PROCEDURE — 94002 VENT MGMT INPAT INIT DAY: CPT

## 2021-07-07 PROCEDURE — 87640 STAPH A DNA AMP PROBE: CPT

## 2021-07-07 PROCEDURE — 86901 BLOOD TYPING SEROLOGIC RH(D): CPT

## 2021-07-07 PROCEDURE — C1769: CPT

## 2021-07-07 PROCEDURE — 85027 COMPLETE CBC AUTOMATED: CPT

## 2021-07-07 PROCEDURE — 85384 FIBRINOGEN ACTIVITY: CPT

## 2021-07-07 PROCEDURE — 86891 AUTOLOGOUS BLOOD OP SALVAGE: CPT

## 2021-07-07 PROCEDURE — 82553 CREATINE MB FRACTION: CPT

## 2021-07-07 PROCEDURE — 82962 GLUCOSE BLOOD TEST: CPT

## 2021-07-07 PROCEDURE — 84132 ASSAY OF SERUM POTASSIUM: CPT

## 2021-07-07 PROCEDURE — 87641 MR-STAPH DNA AMP PROBE: CPT

## 2021-07-07 PROCEDURE — 84439 ASSAY OF FREE THYROXINE: CPT

## 2021-07-07 PROCEDURE — C1751: CPT

## 2021-07-07 PROCEDURE — C1729: CPT

## 2021-07-07 PROCEDURE — C8929: CPT

## 2021-07-07 PROCEDURE — 85014 HEMATOCRIT: CPT

## 2021-07-07 PROCEDURE — 85049 AUTOMATED PLATELET COUNT: CPT

## 2021-07-07 PROCEDURE — 82803 BLOOD GASES ANY COMBINATION: CPT

## 2021-07-07 PROCEDURE — 85730 THROMBOPLASTIN TIME PARTIAL: CPT

## 2021-07-07 RX ORDER — POTASSIUM CHLORIDE 20 MEQ
20 PACKET (EA) ORAL ONCE
Refills: 0 | Status: COMPLETED | OUTPATIENT
Start: 2021-07-07 | End: 2021-07-07

## 2021-07-07 RX ORDER — OXYCODONE HYDROCHLORIDE 5 MG/1
1 TABLET ORAL
Qty: 20 | Refills: 0
Start: 2021-07-07 | End: 2021-07-11

## 2021-07-07 RX ORDER — METOPROLOL TARTRATE 50 MG
75 TABLET ORAL DAILY
Refills: 0 | Status: DISCONTINUED | OUTPATIENT
Start: 2021-07-07 | End: 2021-07-07

## 2021-07-07 RX ORDER — PANTOPRAZOLE SODIUM 20 MG/1
1 TABLET, DELAYED RELEASE ORAL
Qty: 30 | Refills: 0
Start: 2021-07-07 | End: 2021-08-05

## 2021-07-07 RX ORDER — ASPIRIN/CALCIUM CARB/MAGNESIUM 324 MG
1 TABLET ORAL
Qty: 30 | Refills: 0
Start: 2021-07-07 | End: 2021-08-05

## 2021-07-07 RX ORDER — ATORVASTATIN CALCIUM 80 MG/1
1 TABLET, FILM COATED ORAL
Qty: 30 | Refills: 0
Start: 2021-07-07 | End: 2021-08-05

## 2021-07-07 RX ORDER — METOPROLOL TARTRATE 50 MG
25 TABLET ORAL ONCE
Refills: 0 | Status: COMPLETED | OUTPATIENT
Start: 2021-07-07 | End: 2021-07-07

## 2021-07-07 RX ORDER — METOPROLOL TARTRATE 50 MG
3 TABLET ORAL
Qty: 90 | Refills: 0
Start: 2021-07-07 | End: 2021-08-05

## 2021-07-07 RX ORDER — ACETAMINOPHEN 500 MG
2 TABLET ORAL
Qty: 0 | Refills: 0 | DISCHARGE
Start: 2021-07-07

## 2021-07-07 RX ADMIN — Medication 50 MILLIGRAM(S): at 06:22

## 2021-07-07 RX ADMIN — Medication 81 MILLIGRAM(S): at 11:41

## 2021-07-07 RX ADMIN — PANTOPRAZOLE SODIUM 40 MILLIGRAM(S): 20 TABLET, DELAYED RELEASE ORAL at 06:23

## 2021-07-07 RX ADMIN — Medication 25 MILLIGRAM(S): at 09:25

## 2021-07-07 RX ADMIN — Medication 20 MILLIEQUIVALENT(S): at 09:24

## 2021-07-07 NOTE — DISCHARGE NOTE PROVIDER - HOSPITAL COURSE
70 y.o. male with pmHX HTN, HLD, remote hx laparoscopic removal of neuroendocrine pancreatic tumor 14 years who presented with c/o exertional chest pain x 6 weeks. s/p Cath finding of multivessel CAD.      On 7/4 s/p Coronary Artery Bypass Graft BIMA x 2  Post op Course:   Hyperglycemia à Requiring insulin gtt. Weaned off   7/5 Transferred to SDU  7/6 VVS; d/c chest tubes and PW, change to Toprol xl, transfer to floor.   7/7 ST  à Toprol increased to 75 mg PO daily.  Patient medically cleared per Dr. Westfall for discharge home today.

## 2021-07-07 NOTE — DISCHARGE NOTE PROVIDER - CARE PROVIDER_API CALL
Doni Westfall)  Surgery; Thoracic and Cardiac Surgery  [address] [place] [address]  Phone: [phone]  Fax: [phone]  Scheduled Appointment: 07/15/2021 12:45 PM    Tarik Almazan  CARDIOVASCULAR DISEASE  [address] [place] [address], [address]  [place] [address]  Phone: [phone]  Fax: [phone]  Established Patient  Follow Up Time: 2 weeks

## 2021-07-07 NOTE — DISCHARGE NOTE PROVIDER - PROVIDER TOKENS
PROVIDER:[TOKEN:[[id number]],SCHEDULEDAPPT:[07/15/2021],SCHEDULEDAPPTTIME:[12:45 PM]],PROVIDER:[TOKEN:[[id number]],FOLLOWUP:[2 weeks],ESTABLISHEDPATIENT:[T]]

## 2021-07-07 NOTE — DISCHARGE NOTE PROVIDER - NSDCPNSUBOBJ_GEN_ALL_CORE
VITAL SIGNS    Subjective: Denies CP, palpitation, SOB, GANT, HA, dizziness, N/V/D, fever or chills.  No acute event noted overnight.     Telemetry:  NSR/ ST 's     Vital Signs Last 24 Hrs  T(C): 36.6 (07-07-21 @ 06:00), Max: 36.6 (07-06-21 @ 11:13)  T(F): 97.9 (07-07-21 @ 06:00), Max: 97.9 (07-06-21 @ 11:13)  HR: 75 (07-07-21 @ 09:31) (75 - 84)  BP: 134/93 (07-07-21 @ 09:31) (134/82 - 144/91)  RR: 18 (07-07-21 @ 06:00) (16 - 18)  SpO2: 94% (07-07-21 @ 06:00) (94% - 96%)           07-06 @ 07:01  -  07-07 @ 07:00  --------------------------------------------------------  IN: 600 mL / OUT: 1930 mL / NET: -1330 mL    07-07 @ 07:01  -  07-07 @ 11:08  --------------------------------------------------------  IN: 240 mL / OUT: 750 mL / NET: -510 mL    PHYSICAL EXAM    Neurology: alert and oriented x 3, nonfocal, no gross deficits    CV: (+) S1 and S2, No murmurs, rubs, gallops or clicks     Sternal Wound:  MSI -->CDI, sternum stable    Lungs: CTA B/L     Abdomen: soft, nontender, nondistended, positive bowel sounds, (+) Flatus; (+) BM     :  Voiding               Extremities:  B/L LE (+) trace edema; negative calf tenderness; (+) 2 DP palpable        acetaminophen   Tablet .. 650 milliGRAM(s) Oral every 6 hours PRN  aspirin enteric coated 81 milliGRAM(s) Oral daily  atorvastatin 40 milliGRAM(s) Oral at bedtime  enoxaparin Injectable 40 milliGRAM(s) SubCutaneous daily  metoprolol succinate ER 75 milliGRAM(s) Oral daily  oxyCODONE IR 5 milliGRAM(s) Oral every 4 hours PRN  oxyCODONE IR 10 milliGRAM(s) Oral every 6 hours PRN  pantoprazole tablet 40 milliGRAM(s) Oral before breakfast    Discussed with Cardiothoracic Team at AM rounds.    Spent 30 min face to face encounter with patient and discharge note.

## 2021-07-07 NOTE — DISCHARGE NOTE PROVIDER - NSDCFUADDAPPT_GEN_ALL_CORE_FT
1. Follow up with Dr. Westfall on Thursday 7/15/21 at 12:45pm for a post op follow up visit, please call the office to schedule an appointment at [phone].   2. Please schedule an appointment with your PCP/ Cardiologist Dr. Almazan in 1-2 weeks, please call the office to schedule an appointment.

## 2021-07-07 NOTE — DISCHARGE NOTE PROVIDER - NSDCCPCAREPLAN_GEN_ALL_CORE_FT
PRINCIPAL DISCHARGE DIAGNOSIS  Diagnosis: S/P CABG x 2  Assessment and Plan of Treatment:   1. Daily Shower to start tomorrow 7/8/21   2. Weight yourself daily and notify any weight gain greater than 2-3 pounds in 24 hours.  3. Regular DASH diet - low fat, low cholesterol, no added salt.  4. Cleanse Midsternal incision daily while showering with warm water and mild soap, pat dry and maintain open to air.   5. Follow Cardiac Surgery Do's and Don'ts discharge instructions.   6. No driving until cleared by MD.   7. No heavy lifting nothing greater than 5 pounds until cleared by MD.   8. Call / Notify MD any fever greater than 101.0  9. Increase Activity as tolerated.   10. Continue on current medication regimen as instructed on your discharge paperwork.

## 2021-07-07 NOTE — DISCHARGE NOTE PROVIDER - NSDCMRMEDTOKEN_GEN_ALL_CORE_FT
acetaminophen 325 mg oral tablet: 2 tab(s) orally every 6 hours, As needed, Mild Pain (1 - 3)  aspirin 81 mg oral delayed release tablet: 1 tab(s) orally once a day  atorvastatin 40 mg oral tablet: 1 tab(s) orally once a day (at bedtime)  metoprolol succinate 25 mg oral tablet, extended release: 3 tab(s) (75 mg) orally once a day  oxyCODONE 10 mg oral tablet: 1 tab(s) orally every 6 hours, As needed, Severe Pain (7 - 10) MDD:4 tabs  pantoprazole 40 mg oral delayed release tablet: 1 tab(s) orally once a day (before a meal)

## 2021-07-08 ENCOUNTER — NON-APPOINTMENT (OUTPATIENT)
Age: 71
End: 2021-07-08

## 2021-07-08 RX ORDER — ATORVASTATIN CALCIUM 40 MG/1
40 TABLET, FILM COATED ORAL
Refills: 0 | Status: ACTIVE | COMMUNITY

## 2021-07-08 RX ORDER — LOSARTAN POTASSIUM 50 MG/1
50 TABLET, FILM COATED ORAL
Refills: 0 | Status: DISCONTINUED | COMMUNITY
End: 2021-07-08

## 2021-07-09 ENCOUNTER — APPOINTMENT (OUTPATIENT)
Dept: CARE COORDINATION | Facility: HOME HEALTH | Age: 71
End: 2021-07-09
Payer: MEDICARE

## 2021-07-09 VITALS
SYSTOLIC BLOOD PRESSURE: 120 MMHG | RESPIRATION RATE: 16 BRPM | OXYGEN SATURATION: 96 % | HEART RATE: 90 BPM | DIASTOLIC BLOOD PRESSURE: 70 MMHG

## 2021-07-09 PROCEDURE — 99024 POSTOP FOLLOW-UP VISIT: CPT

## 2021-07-09 NOTE — ASSESSMENT
[FreeTextEntry1] : 70 y.o. 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

## 2021-07-09 NOTE — HISTORY OF PRESENT ILLNESS
[FreeTextEntry1] : FOLLOW YOUR HEART HOME VISIT-[facility]\par Home Visit made Harleen TORREZ ] .  A patient of Dr Scott   S/P CABGx2   on 7/4. Discharged from [facility]\par \par \par Visiting patient for post discharge transitional care management and post surgical follow up. \par Arrived to pt home. He is accompanied by his wife.\par He appears well sitting on the couch\par

## 2021-07-09 NOTE — PHYSICAL EXAM
[Sclera] : the sclera and conjunctiva were normal [PERRL With Normal Accommodation] : pupils were equal in size, round, and reactive to light [Neck Appearance] : the appearance of the neck was normal [Neck Cervical Mass (___cm)] : no neck mass was observed [Auscultation Breath Sounds / Voice Sounds] : lungs were clear to auscultation bilaterally [Heart Rate And Rhythm] : heart rate was normal and rhythm regular [Heart Sounds] : normal S1 and S2 [1+] : left 1+ [Abdomen Soft] : soft [Abdomen Tenderness] : non-tender [No CVA Tenderness] : no ~M costovertebral angle tenderness [Abnormal Walk] : normal gait [Skin Color & Pigmentation] : normal skin color and pigmentation [Oriented To Time, Place, And Person] : oriented to person, place, and time [FreeTextEntry1] : MTI-approximated

## 2021-07-10 ENCOUNTER — TRANSCRIPTION ENCOUNTER (OUTPATIENT)
Age: 71
End: 2021-07-10

## 2021-07-13 ENCOUNTER — TRANSCRIPTION ENCOUNTER (OUTPATIENT)
Age: 71
End: 2021-07-13

## 2021-07-14 PROBLEM — Z09 POSTOPERATIVE FOLLOW-UP: Status: ACTIVE | Noted: 2021-07-14

## 2021-07-14 PROBLEM — Z95.1 S/P CABG X 2: Status: ACTIVE | Noted: 2021-07-09

## 2021-07-14 RX ORDER — ASPIRIN 81 MG
81 TABLET, DELAYED RELEASE (ENTERIC COATED) ORAL
Refills: 0 | Status: ACTIVE | COMMUNITY

## 2021-07-14 RX ORDER — PSYLLIUM SEED
PACKET (EA) ORAL
Refills: 0 | Status: COMPLETED | COMMUNITY
End: 2021-07-14

## 2021-07-14 RX ORDER — SENNOSIDES 8.6 MG
TABLET ORAL
Refills: 0 | Status: COMPLETED | COMMUNITY
End: 2021-07-14

## 2021-07-14 RX ORDER — BENZOCAINE/BENZALKONIUM/ALOE/E 5 %-0.13 %
CREAM (GRAM) TOPICAL
Refills: 0 | Status: COMPLETED | COMMUNITY
End: 2021-07-14

## 2021-07-15 ENCOUNTER — APPOINTMENT (OUTPATIENT)
Dept: CARDIOTHORACIC SURGERY | Facility: CLINIC | Age: 71
End: 2021-07-15
Payer: MEDICARE

## 2021-07-15 VITALS
RESPIRATION RATE: 16 BRPM | TEMPERATURE: 97.8 F | DIASTOLIC BLOOD PRESSURE: 78 MMHG | OXYGEN SATURATION: 98 % | HEART RATE: 80 BPM | SYSTOLIC BLOOD PRESSURE: 126 MMHG | HEIGHT: 75 IN

## 2021-07-15 DIAGNOSIS — Z09 ENCOUNTER FOR FOLLOW-UP EXAMINATION AFTER COMPLETED TREATMENT FOR CONDITIONS OTHER THAN MALIGNANT NEOPLASM: ICD-10-CM

## 2021-07-15 DIAGNOSIS — Z95.1 PRESENCE OF AORTOCORONARY BYPASS GRAFT: ICD-10-CM

## 2021-07-15 PROCEDURE — 99024 POSTOP FOLLOW-UP VISIT: CPT

## 2021-07-15 RX ORDER — OXYCODONE 10 MG/1
10 TABLET ORAL EVERY 6 HOURS
Qty: 20 | Refills: 0 | Status: COMPLETED | COMMUNITY
End: 2021-07-15

## 2021-08-05 ENCOUNTER — TRANSCRIPTION ENCOUNTER (OUTPATIENT)
Age: 71
End: 2021-08-05

## 2022-02-01 ENCOUNTER — APPOINTMENT (OUTPATIENT)
Dept: DERMATOLOGY | Facility: CLINIC | Age: 72
End: 2022-02-01
Payer: MEDICARE

## 2022-02-01 VITALS — HEIGHT: 75 IN | BODY MASS INDEX: 23.25 KG/M2 | WEIGHT: 187 LBS

## 2022-02-01 DIAGNOSIS — L85.3 XEROSIS CUTIS: ICD-10-CM

## 2022-02-01 DIAGNOSIS — D22.9 MELANOCYTIC NEVI, UNSPECIFIED: ICD-10-CM

## 2022-02-01 DIAGNOSIS — D18.01 HEMANGIOMA OF SKIN AND SUBCUTANEOUS TISSUE: ICD-10-CM

## 2022-02-01 DIAGNOSIS — Z12.83 ENCOUNTER FOR SCREENING FOR MALIGNANT NEOPLASM OF SKIN: ICD-10-CM

## 2022-02-01 DIAGNOSIS — L82.1 OTHER SEBORRHEIC KERATOSIS: ICD-10-CM

## 2022-02-01 PROCEDURE — 99203 OFFICE O/P NEW LOW 30 MIN: CPT | Mod: GC

## 2022-06-28 NOTE — PATIENT PROFILE ADULT - NSPRESCRALCFREQ_GEN_A_NUR
"Pt called and stated that he getting two different lab results from [facility]. He has given [facility] Ambreen Singleton PA-C and [facility] orders for the marly blood drawn but getting two different results.     I called [facility] about the lab results abnormalities and they said that we need to to redrawn. [facility] will do it with no charge if Ambreen Singleton PA-C put in the billing \"No Charge Request Test Request\".       " Monthly or less

## 2023-01-09 NOTE — PATIENT PROFILE ADULT - LAST TOBACCO USE (DD-MM-YY)
Chronic Obstructive Pulmonary Disease  Chronic obstructive pulmonary disease (COPD) is a long-term (chronic) lung problem. When you have COPD, it is hard for air to get in and out of your lungs.  Usually the condition gets worse over time, and your lungs will never return to normal. There are things you can do to keep yourself as healthy as possible.  What are the causes?  Smoking. This is the most common cause.  Certain genes passed from parent to child (inherited).  What increases the risk?  Being exposed to secondhand smoke from cigarettes, pipes, or cigars.  Being exposed to chemicals and other irritants, such as fumes and dust in the work environment.  Having chronic lung conditions or infections.  What are the signs or symptoms?  Shortness of breath, especially during physical activity.  A long-term cough with a large amount of thick mucus. Sometimes, the cough may not have any mucus (dry cough).  Wheezing.  Breathing quickly.  Skin that looks gray or blue, especially in the fingers, toes, or lips.  Feeling tired (fatigue).  Weight loss.  Chest tightness.  Having infections often.  Episodes when breathing symptoms become much worse (exacerbations).  At the later stages of this disease, you may have swelling in the ankles, feet, or legs.  How is this treated?  Taking medicines.  Quitting smoking, if you smoke.  Rehabilitation. This includes steps to make your body work better. It may involve a team of specialists.  Doing exercises.  Making changes to your diet.  Using oxygen.  Lung surgery.  Lung transplant.  Comfort measures (palliative care).  Follow these instructions at home:  Medicines  Take over-the-counter and prescription medicines only as told by your doctor.  Talk to your doctor before taking any cough or allergy medicines. You may need to avoid medicines that cause your lungs to be dry.  Lifestyle  If you smoke, stop smoking. Smoking makes the problem worse.  Do not smoke or use any products that  contain nicotine or tobacco. If you need help quitting, ask your doctor.  Avoid being around things that make your breathing worse. This may include smoke, chemicals, and fumes.  Stay active, but remember to rest as well.  Learn and use tips on how to manage stress and control your breathing.  Make sure you get enough sleep. Most adults need at least 7 hours of sleep every night.  Eat healthy foods. Eat smaller meals more often. Rest before meals.  Controlled breathing  Learn and use tips on how to control your breathing as told by your doctor. Try:  Breathing in (inhaling) through your nose for 1 second. Then, pucker your lips and breath out (exhale) through your lips for 2 seconds.  Putting one hand on your belly (abdomen). Breathe in slowly through your nose for 1 second. Your hand on your belly should move out. Pucker your lips and breathe out slowly through your lips. Your hand on your belly should move in as you breathe out.    Controlled coughing  Learn and use controlled coughing to clear mucus from your lungs. Follow these steps:  Lean your head a little forward.  Breathe in deeply.  Try to hold your breath for 3 seconds.  Keep your mouth slightly open while coughing 2 times.  Spit any mucus out into a tissue.  Rest and do the steps again 1 or 2 times as needed.  General instructions  Make sure you get all the shots (vaccines) that your doctor recommends. Ask your doctor about a flu shot and a pneumonia shot.  Use oxygen therapy and pulmonary rehabilitation if told by your doctor. If you need home oxygen therapy, ask your doctor if you should buy a tool to measure your oxygen level (oximeter).  Make a COPD action plan with your doctor. This helps you to know what to do if you feel worse than usual.  Manage any other conditions you have as told by your doctor.  Avoid going outside when it is very hot, cold, or humid.  Avoid people who have a sickness you can catch (contagious).  Keep all follow-up  visits.  Contact a doctor if:  You cough up more mucus than usual.  There is a change in the color or thickness of the mucus.  It is harder to breathe than usual.  Your breathing is faster than usual.  You have trouble sleeping.  You need to use your medicines more often than usual.  You have trouble doing your normal activities such as getting dressed or walking around the house.  Get help right away if:  You have shortness of breath while resting.  You have shortness of breath that stops you from:  Being able to talk.  Doing normal activities.  Your chest hurts for longer than 5 minutes.  Your skin color is more blue than usual.  Your pulse oximeter shows that you have low oxygen for longer than 5 minutes.  You have a fever.  You feel too tired to breathe normally.  These symptoms may represent a serious problem that is an emergency. Do not wait to see if the symptoms will go away. Get medical help right away. Call your local emergency services (911 in the [place] Do not drive yourself to the hospital.  Summary  Chronic obstructive pulmonary disease (COPD) is a long-term lung problem.  The way your lungs work will never return to normal. Usually the condition gets worse over time. There are things you can do to keep yourself as healthy as possible.  Take over-the-counter and prescription medicines only as told by your doctor.  If you smoke, stop. Smoking makes the problem worse.  This information is not intended to replace advice given to you by your health care provider. Make sure you discuss any questions you have with your health care provider.  Document Revised: 10/26/2021 Document Reviewed: 10/26/2021  Elsevier Patient Education © 2022 [employer]     01-Jul-1989

## 2023-04-12 NOTE — H&P ADULT - NSHPLABSRESULTS_GEN_ALL_CORE
< from: Cardiac Cath Lab - Adult (07.01.21 @ 14:48) >    LM:   --  LM: Angiography showed minor luminal irregularities with no flow  limiting lesions.  LAD:   --  Ostial LAD: There was a discrete 80 % stenosis. There was JOHN  grade 3 flow through the vessel (brisk flow).  --  Mid LAD: There was a tubular 80 % stenosis in the proximal third of the  vessel segment, just after S1. There was JOHN grade 3 flow through the  vessel (brisk flow).  CX:   --  Ostial circumflex: There was a discrete 30 % stenosis.  --  Proximal circumflex: There was a discrete 75 % stenosis. There was JOHN  grade 3 flow through the vessel (brisk flow).  RCA:   --  RCA: Angiography showed minor luminal irregularities with no  flow limiting lesions.  --  Mid RCA: The vessel was mildly ectatic.  --  Distal RCA: There was a tubular 20 % stenosis.  COMPLICATIONS: There were no complications.  DIAGNOSTIC RECOMMENDATIONS: Consultation will be obtained for coronary  artery bypass grafting.  Prepared and signed by  Tomer Bush M.D.  Signed 07/01/2021 16:05:29  HEMODYNAMIC TABLES    < end of copied text >
Detail Level: Simple
Additional Note: We can treat if it becomes bothersome
Include Location In Plan?: No
Detail Level: Zone
Detail Level: Detailed
Additional Notes (Optional): Treatment options discussed

## 2023-06-22 ENCOUNTER — APPOINTMENT (OUTPATIENT)
Dept: COLORECTAL SURGERY | Facility: CLINIC | Age: 73
End: 2023-06-22
Payer: MEDICARE

## 2023-06-22 VITALS
WEIGHT: 198 LBS | DIASTOLIC BLOOD PRESSURE: 91 MMHG | HEIGHT: 75 IN | OXYGEN SATURATION: 98 % | BODY MASS INDEX: 24.62 KG/M2 | RESPIRATION RATE: 15 BRPM | SYSTOLIC BLOOD PRESSURE: 133 MMHG | TEMPERATURE: 98.2 F | HEART RATE: 69 BPM

## 2023-06-22 PROCEDURE — 99213 OFFICE O/P EST LOW 20 MIN: CPT

## 2023-06-22 RX ORDER — LOSARTAN POTASSIUM 100 MG/1
TABLET, FILM COATED ORAL
Refills: 0 | Status: ACTIVE | COMMUNITY

## 2023-06-22 RX ORDER — METOPROLOL SUCCINATE 25 MG/1
25 TABLET, EXTENDED RELEASE ORAL DAILY
Refills: 0 | Status: DISCONTINUED | COMMUNITY
End: 2023-06-22

## 2023-06-22 RX ORDER — PANTOPRAZOLE SODIUM 40 MG/1
40 GRANULE, DELAYED RELEASE ORAL DAILY
Qty: 30 | Refills: 0 | Status: DISCONTINUED | COMMUNITY
End: 2023-06-22

## 2023-06-22 RX ORDER — CHOLECALCIFEROL (VITAMIN D3) 25 MCG
TABLET ORAL
Refills: 0 | Status: ACTIVE | COMMUNITY

## 2023-06-22 NOTE — REASON FOR VISIT
[Consultation] : a consultation visit [Spouse] : spouse [FreeTextEntry1] : patient intake forms reviewed

## 2023-06-22 NOTE — PHYSICAL EXAM
[Normal Breath Sounds] : Normal breath sounds [Normal Heart Sounds] : normal heart sounds [Normal Rate and Rhythm] : normal rate and rhythm [No Rash or Lesion] : No rash or lesion [Alert] : alert [Oriented to Person] : oriented to person [Oriented to Place] : oriented to place [Oriented to Time] : oriented to time [Calm] : calm [de-identified] : soft, NT/ND, +BS [de-identified] : well nourished elderly male [de-identified] : NC/AT [de-identified] : PAULINA/+ROM [de-identified] : intact

## 2023-06-22 NOTE — REVIEW OF SYSTEMS
[As Noted in HPI] : as noted in HPI [Constipation] : constipation [Negative] : Heme/Lymph [FreeTextEntry5] : HTN, elevated cholesterol, bypass surgery

## 2023-06-22 NOTE — ASSESSMENT
[FreeTextEntry1] : History of colon cancer with change in bowel habits\par -Patient is scheduled for surveillance colonoscopy\par -Risks and benefits were reviewed\par -Bowel prep was given\par -All questions answered\par -Patient to proceed as scheduled

## 2023-06-22 NOTE — HISTORY OF PRESENT ILLNESS
[FreeTextEntry1] : 72 year old male presents for pre-colonoscopy appointment. Last colonoscopy was in 2019 which revealed IH and diverticulosis. Patient states that sometimes his stool is difficult to evacuate. He will have a BM everyday if not every other day but states that he has to strain to evacuate the stool. He started to be more conscious of his water intake which he states helps him with his BMs. He also reports a high fiber diet. He denies any rectal bleeding or pain. \par \par Patient's mother  of colon cancer at the age of 57.

## 2023-07-06 ENCOUNTER — APPOINTMENT (OUTPATIENT)
Dept: COLORECTAL SURGERY | Facility: CLINIC | Age: 73
End: 2023-07-06

## 2023-09-07 ENCOUNTER — APPOINTMENT (OUTPATIENT)
Dept: COLORECTAL SURGERY | Facility: CLINIC | Age: 73
End: 2023-09-07
Payer: MEDICARE

## 2023-09-07 DIAGNOSIS — K57.90 DIVERTICULOSIS OF INTESTINE, PART UNSPECIFIED, W/OUT PERFORATION OR ABSCESS W/OUT BLEEDING: ICD-10-CM

## 2023-09-07 DIAGNOSIS — K64.8 OTHER HEMORRHOIDS: ICD-10-CM

## 2023-09-07 PROCEDURE — 45378 DIAGNOSTIC COLONOSCOPY: CPT

## 2024-04-15 NOTE — H&P CARDIOLOGY - CARDIOVASCULAR
"----- Message from Reilly Uribe MD sent at 4/15/2024  3:51 PM CDT -----  Procedure: EGD    Diagnosis: GERD    Procedure Timing: Within 4 weeks (Urgent)    #If within 4 weeks selected, please baylee as high priority#    #If greater than 12 weeks, please select "5-12 weeks" and delay sending until 3 months prior to requested date#     Provider: Any endoscopist    Location: No Preference    Additional Scheduling Information: No scheduling concerns    Prep Specifications:N/A    Is the patient taking a GLP-1 Agonist:no    Have you attached a patient to this message: yes  " detailed exam

## 2024-07-11 ENCOUNTER — APPOINTMENT (OUTPATIENT)
Dept: UROLOGY | Facility: CLINIC | Age: 74
End: 2024-07-11
Payer: MEDICARE

## 2024-07-11 VITALS
TEMPERATURE: 97.7 F | SYSTOLIC BLOOD PRESSURE: 149 MMHG | RESPIRATION RATE: 17 BRPM | BODY MASS INDEX: 24.87 KG/M2 | DIASTOLIC BLOOD PRESSURE: 96 MMHG | WEIGHT: 200 LBS | HEIGHT: 75 IN | HEART RATE: 72 BPM

## 2024-07-11 DIAGNOSIS — N40.1 BENIGN PROSTATIC HYPERPLASIA WITH LOWER URINARY TRACT SYMPMS: ICD-10-CM

## 2024-07-11 DIAGNOSIS — N13.8 BENIGN PROSTATIC HYPERPLASIA WITH LOWER URINARY TRACT SYMPMS: ICD-10-CM

## 2024-07-11 PROCEDURE — 99204 OFFICE O/P NEW MOD 45 MIN: CPT

## 2024-07-11 RX ORDER — FINASTERIDE 5 MG/1
5 TABLET, FILM COATED ORAL DAILY
Qty: 90 | Refills: 3 | Status: ACTIVE | COMMUNITY
Start: 2024-07-11 | End: 1900-01-01

## 2024-07-16 ENCOUNTER — OFFICE (OUTPATIENT)
Dept: URBAN - METROPOLITAN AREA CLINIC 90 | Facility: CLINIC | Age: 74
Setting detail: OPHTHALMOLOGY
End: 2024-07-16
Payer: MEDICARE

## 2024-07-16 DIAGNOSIS — H01.004: ICD-10-CM

## 2024-07-16 DIAGNOSIS — H01.002: ICD-10-CM

## 2024-07-16 DIAGNOSIS — H43.811: ICD-10-CM

## 2024-07-16 DIAGNOSIS — H01.001: ICD-10-CM

## 2024-07-16 DIAGNOSIS — H43.393: ICD-10-CM

## 2024-07-16 DIAGNOSIS — H25.13: ICD-10-CM

## 2024-07-16 DIAGNOSIS — H01.005: ICD-10-CM

## 2024-07-16 PROCEDURE — 92004 COMPRE OPH EXAM NEW PT 1/>: CPT | Performed by: OPHTHALMOLOGY

## 2024-07-16 ASSESSMENT — LID EXAM ASSESSMENTS
OD_BLEPHARITIS: RLL RUL 2+
OS_BLEPHARITIS: LLL LUL 2+

## 2024-07-16 ASSESSMENT — CONFRONTATIONAL VISUAL FIELD TEST (CVF)
OS_FINDINGS: FULL
OD_FINDINGS: FULL

## 2024-10-15 RX ORDER — TAMSULOSIN HYDROCHLORIDE 0.4 MG/1
0.4 CAPSULE ORAL
Qty: 90 | Refills: 3 | Status: ACTIVE | COMMUNITY
Start: 2024-10-15 | End: 1900-01-01

## 2025-01-16 ENCOUNTER — APPOINTMENT (OUTPATIENT)
Dept: UROLOGY | Facility: CLINIC | Age: 75
End: 2025-01-16
Payer: MEDICARE

## 2025-01-16 ENCOUNTER — NON-APPOINTMENT (OUTPATIENT)
Age: 75
End: 2025-01-16

## 2025-01-16 VITALS
BODY MASS INDEX: 24.87 KG/M2 | RESPIRATION RATE: 17 BRPM | DIASTOLIC BLOOD PRESSURE: 87 MMHG | HEIGHT: 75 IN | HEART RATE: 78 BPM | SYSTOLIC BLOOD PRESSURE: 143 MMHG | TEMPERATURE: 97.5 F | WEIGHT: 200 LBS

## 2025-01-16 DIAGNOSIS — N40.1 BENIGN PROSTATIC HYPERPLASIA WITH LOWER URINARY TRACT SYMPMS: ICD-10-CM

## 2025-01-16 DIAGNOSIS — N13.8 BENIGN PROSTATIC HYPERPLASIA WITH LOWER URINARY TRACT SYMPMS: ICD-10-CM

## 2025-01-16 DIAGNOSIS — R31.0 GROSS HEMATURIA: ICD-10-CM

## 2025-01-16 PROCEDURE — 51798 US URINE CAPACITY MEASURE: CPT

## 2025-01-16 PROCEDURE — G2211 COMPLEX E/M VISIT ADD ON: CPT

## 2025-01-16 PROCEDURE — 99214 OFFICE O/P EST MOD 30 MIN: CPT

## 2025-01-31 ENCOUNTER — APPOINTMENT (OUTPATIENT)
Dept: CT IMAGING | Facility: IMAGING CENTER | Age: 75
End: 2025-01-31
Payer: MEDICARE

## 2025-01-31 ENCOUNTER — OUTPATIENT (OUTPATIENT)
Dept: OUTPATIENT SERVICES | Facility: HOSPITAL | Age: 75
LOS: 1 days | End: 2025-01-31
Payer: MEDICARE

## 2025-01-31 DIAGNOSIS — N40.1 BENIGN PROSTATIC HYPERPLASIA WITH LOWER URINARY TRACT SYMPTOMS: ICD-10-CM

## 2025-01-31 DIAGNOSIS — R31.0 GROSS HEMATURIA: ICD-10-CM

## 2025-01-31 PROCEDURE — 74178 CT ABD&PLV WO CNTR FLWD CNTR: CPT

## 2025-01-31 PROCEDURE — 74178 CT ABD&PLV WO CNTR FLWD CNTR: CPT | Mod: 26

## 2025-02-01 ENCOUNTER — TRANSCRIPTION ENCOUNTER (OUTPATIENT)
Age: 75
End: 2025-02-01

## 2025-02-03 ENCOUNTER — NON-APPOINTMENT (OUTPATIENT)
Age: 75
End: 2025-02-03

## 2025-03-06 ENCOUNTER — APPOINTMENT (OUTPATIENT)
Dept: UROLOGY | Facility: CLINIC | Age: 75
End: 2025-03-06
Payer: MEDICARE

## 2025-03-06 DIAGNOSIS — N13.8 BENIGN PROSTATIC HYPERPLASIA WITH LOWER URINARY TRACT SYMPMS: ICD-10-CM

## 2025-03-06 DIAGNOSIS — N40.1 BENIGN PROSTATIC HYPERPLASIA WITH LOWER URINARY TRACT SYMPMS: ICD-10-CM

## 2025-03-06 PROCEDURE — 99214 OFFICE O/P EST MOD 30 MIN: CPT

## 2025-07-12 ENCOUNTER — RX RENEWAL (OUTPATIENT)
Age: 75
End: 2025-07-12